# Patient Record
Sex: MALE | NOT HISPANIC OR LATINO | Employment: FULL TIME | URBAN - METROPOLITAN AREA
[De-identification: names, ages, dates, MRNs, and addresses within clinical notes are randomized per-mention and may not be internally consistent; named-entity substitution may affect disease eponyms.]

---

## 2017-04-27 ENCOUNTER — OFFICE VISIT - HEALTHEAST (OUTPATIENT)
Dept: INTERNAL MEDICINE | Facility: CLINIC | Age: 57
End: 2017-04-27

## 2017-04-27 DIAGNOSIS — Z00.00 ROUTINE GENERAL MEDICAL EXAMINATION AT A HEALTH CARE FACILITY: ICD-10-CM

## 2017-04-27 LAB
CHOLEST SERPL-MCNC: 160 MG/DL
FASTING STATUS PATIENT QL REPORTED: YES
HDLC SERPL-MCNC: 50 MG/DL
LDLC SERPL CALC-MCNC: 99 MG/DL
PSA SERPL-MCNC: 0.5 NG/ML (ref 0–3.5)
TRIGL SERPL-MCNC: 56 MG/DL

## 2017-04-27 ASSESSMENT — MIFFLIN-ST. JEOR: SCORE: 1730.92

## 2017-04-28 ENCOUNTER — COMMUNICATION - HEALTHEAST (OUTPATIENT)
Dept: INTERNAL MEDICINE | Facility: CLINIC | Age: 57
End: 2017-04-28

## 2017-05-02 ENCOUNTER — RECORDS - HEALTHEAST (OUTPATIENT)
Dept: ADMINISTRATIVE | Facility: OTHER | Age: 57
End: 2017-05-02

## 2017-09-14 ENCOUNTER — RECORDS - HEALTHEAST (OUTPATIENT)
Dept: ADMINISTRATIVE | Facility: OTHER | Age: 57
End: 2017-09-14

## 2017-10-26 ENCOUNTER — RECORDS - HEALTHEAST (OUTPATIENT)
Dept: ADMINISTRATIVE | Facility: OTHER | Age: 57
End: 2017-10-26

## 2018-03-05 ENCOUNTER — COMMUNICATION - HEALTHEAST (OUTPATIENT)
Dept: INTERNAL MEDICINE | Facility: CLINIC | Age: 58
End: 2018-03-05

## 2019-01-25 ENCOUNTER — OFFICE VISIT - HEALTHEAST (OUTPATIENT)
Dept: INTERNAL MEDICINE | Facility: CLINIC | Age: 59
End: 2019-01-25

## 2019-01-25 DIAGNOSIS — Z00.00 ROUTINE GENERAL MEDICAL EXAMINATION AT A HEALTH CARE FACILITY: ICD-10-CM

## 2019-01-25 LAB
ALBUMIN SERPL-MCNC: 4.3 G/DL (ref 3.5–5)
ALBUMIN UR-MCNC: NEGATIVE MG/DL
ALP SERPL-CCNC: 63 U/L (ref 45–120)
ALT SERPL W P-5'-P-CCNC: 38 U/L (ref 0–45)
ANION GAP SERPL CALCULATED.3IONS-SCNC: 9 MMOL/L (ref 5–18)
APPEARANCE UR: CLEAR
AST SERPL W P-5'-P-CCNC: 25 U/L (ref 0–40)
BILIRUB SERPL-MCNC: 1.5 MG/DL (ref 0–1)
BILIRUB UR QL STRIP: NEGATIVE
BUN SERPL-MCNC: 13 MG/DL (ref 8–22)
CALCIUM SERPL-MCNC: 9.9 MG/DL (ref 8.5–10.5)
CHLORIDE BLD-SCNC: 105 MMOL/L (ref 98–107)
CHOLEST SERPL-MCNC: 173 MG/DL
CO2 SERPL-SCNC: 28 MMOL/L (ref 22–31)
COLOR UR AUTO: NORMAL
CREAT SERPL-MCNC: 1.21 MG/DL (ref 0.7–1.3)
ERYTHROCYTE [DISTWIDTH] IN BLOOD BY AUTOMATED COUNT: 12.8 % (ref 11–14.5)
FASTING STATUS PATIENT QL REPORTED: YES
GFR SERPL CREATININE-BSD FRML MDRD: >60 ML/MIN/1.73M2
GLUCOSE BLD-MCNC: 96 MG/DL (ref 70–125)
GLUCOSE UR STRIP-MCNC: NEGATIVE MG/DL
HCT VFR BLD AUTO: 44.9 % (ref 40–54)
HDLC SERPL-MCNC: 53 MG/DL
HGB BLD-MCNC: 15.6 G/DL (ref 14–18)
HGB UR QL STRIP: NEGATIVE
KETONES UR STRIP-MCNC: NEGATIVE MG/DL
LDLC SERPL CALC-MCNC: 109 MG/DL
LEUKOCYTE ESTERASE UR QL STRIP: NEGATIVE
MCH RBC QN AUTO: 31.1 PG (ref 27–34)
MCHC RBC AUTO-ENTMCNC: 34.7 G/DL (ref 32–36)
MCV RBC AUTO: 90 FL (ref 80–100)
NITRATE UR QL: NEGATIVE
PH UR STRIP: 6.5 [PH] (ref 4.5–8)
PLATELET # BLD AUTO: 244 THOU/UL (ref 140–440)
PMV BLD AUTO: 7.6 FL (ref 7–10)
POTASSIUM BLD-SCNC: 4.4 MMOL/L (ref 3.5–5)
PROT SERPL-MCNC: 7.2 G/DL (ref 6–8)
PSA SERPL-MCNC: 0.4 NG/ML (ref 0–3.5)
RBC # BLD AUTO: 5 MILL/UL (ref 4.4–6.2)
SODIUM SERPL-SCNC: 142 MMOL/L (ref 136–145)
SP GR UR STRIP: 1.01 (ref 1–1.03)
TRIGL SERPL-MCNC: 56 MG/DL
TSH SERPL DL<=0.005 MIU/L-ACNC: 1.6 UIU/ML (ref 0.3–5)
UROBILINOGEN UR STRIP-ACNC: NORMAL
WBC: 5.8 THOU/UL (ref 4–11)

## 2019-01-25 ASSESSMENT — MIFFLIN-ST. JEOR: SCORE: 1789.89

## 2019-01-28 ENCOUNTER — COMMUNICATION - HEALTHEAST (OUTPATIENT)
Dept: INTERNAL MEDICINE | Facility: CLINIC | Age: 59
End: 2019-01-28

## 2020-01-16 ENCOUNTER — RECORDS - HEALTHEAST (OUTPATIENT)
Dept: ADMINISTRATIVE | Facility: OTHER | Age: 60
End: 2020-01-16

## 2020-01-27 ENCOUNTER — OFFICE VISIT - HEALTHEAST (OUTPATIENT)
Dept: INTERNAL MEDICINE | Facility: CLINIC | Age: 60
End: 2020-01-27

## 2020-01-27 ENCOUNTER — COMMUNICATION - HEALTHEAST (OUTPATIENT)
Dept: INTERNAL MEDICINE | Facility: CLINIC | Age: 60
End: 2020-01-27

## 2020-01-27 DIAGNOSIS — Z00.00 ROUTINE GENERAL MEDICAL EXAMINATION AT A HEALTH CARE FACILITY: ICD-10-CM

## 2020-01-27 LAB
ALBUMIN SERPL-MCNC: 4.1 G/DL (ref 3.5–5)
ALBUMIN UR-MCNC: NEGATIVE MG/DL
ALP SERPL-CCNC: 57 U/L (ref 45–120)
ALT SERPL W P-5'-P-CCNC: 15 U/L (ref 0–45)
ANION GAP SERPL CALCULATED.3IONS-SCNC: 8 MMOL/L (ref 5–18)
APPEARANCE UR: CLEAR
AST SERPL W P-5'-P-CCNC: 17 U/L (ref 0–40)
BACTERIA #/AREA URNS HPF: ABNORMAL HPF
BILIRUB SERPL-MCNC: 1.1 MG/DL (ref 0–1)
BILIRUB UR QL STRIP: NEGATIVE
BUN SERPL-MCNC: 27 MG/DL (ref 8–22)
CALCIUM SERPL-MCNC: 9.3 MG/DL (ref 8.5–10.5)
CHLORIDE BLD-SCNC: 106 MMOL/L (ref 98–107)
CHOLEST SERPL-MCNC: 149 MG/DL
CO2 SERPL-SCNC: 27 MMOL/L (ref 22–31)
COLOR UR AUTO: YELLOW
CREAT SERPL-MCNC: 1.08 MG/DL (ref 0.7–1.3)
ERYTHROCYTE [DISTWIDTH] IN BLOOD BY AUTOMATED COUNT: 11.4 % (ref 11–14.5)
FASTING STATUS PATIENT QL REPORTED: YES
GFR SERPL CREATININE-BSD FRML MDRD: >60 ML/MIN/1.73M2
GLUCOSE BLD-MCNC: 93 MG/DL (ref 70–125)
GLUCOSE UR STRIP-MCNC: NEGATIVE MG/DL
HCT VFR BLD AUTO: 44.1 % (ref 40–54)
HDLC SERPL-MCNC: 45 MG/DL
HGB BLD-MCNC: 15.1 G/DL (ref 14–18)
HGB UR QL STRIP: ABNORMAL
KETONES UR STRIP-MCNC: NEGATIVE MG/DL
LDLC SERPL CALC-MCNC: 97 MG/DL
LEUKOCYTE ESTERASE UR QL STRIP: NEGATIVE
MCH RBC QN AUTO: 31.2 PG (ref 27–34)
MCHC RBC AUTO-ENTMCNC: 34.3 G/DL (ref 32–36)
MCV RBC AUTO: 91 FL (ref 80–100)
NITRATE UR QL: NEGATIVE
PH UR STRIP: 6 [PH] (ref 5–8)
PLATELET # BLD AUTO: 223 THOU/UL (ref 140–440)
PMV BLD AUTO: 7.7 FL (ref 7–10)
POTASSIUM BLD-SCNC: 4.5 MMOL/L (ref 3.5–5)
PROT SERPL-MCNC: 6.4 G/DL (ref 6–8)
PSA SERPL-MCNC: 0.4 NG/ML (ref 0–4.5)
RBC # BLD AUTO: 4.85 MILL/UL (ref 4.4–6.2)
RBC #/AREA URNS AUTO: ABNORMAL HPF
SODIUM SERPL-SCNC: 141 MMOL/L (ref 136–145)
SP GR UR STRIP: 1.02 (ref 1–1.03)
SQUAMOUS #/AREA URNS AUTO: ABNORMAL LPF
TRIGL SERPL-MCNC: 36 MG/DL
TSH SERPL DL<=0.005 MIU/L-ACNC: 1.58 UIU/ML (ref 0.3–5)
UROBILINOGEN UR STRIP-ACNC: ABNORMAL
WBC #/AREA URNS AUTO: ABNORMAL HPF
WBC: 4.6 THOU/UL (ref 4–11)

## 2020-01-27 ASSESSMENT — MIFFLIN-ST. JEOR: SCORE: 1780.82

## 2020-01-28 ENCOUNTER — COMMUNICATION - HEALTHEAST (OUTPATIENT)
Dept: INTERNAL MEDICINE | Facility: CLINIC | Age: 60
End: 2020-01-28

## 2020-02-05 ENCOUNTER — RECORDS - HEALTHEAST (OUTPATIENT)
Dept: ADMINISTRATIVE | Facility: OTHER | Age: 60
End: 2020-02-05

## 2020-02-07 ENCOUNTER — RECORDS - HEALTHEAST (OUTPATIENT)
Dept: ADMINISTRATIVE | Facility: OTHER | Age: 60
End: 2020-02-07

## 2020-02-12 ENCOUNTER — TRANSFERRED RECORDS (OUTPATIENT)
Dept: HEALTH INFORMATION MANAGEMENT | Facility: CLINIC | Age: 60
End: 2020-02-12

## 2020-02-14 ENCOUNTER — ANESTHESIA - HEALTHEAST (OUTPATIENT)
Dept: SURGERY | Facility: CLINIC | Age: 60
End: 2020-02-14

## 2020-02-14 ENCOUNTER — RECORDS - HEALTHEAST (OUTPATIENT)
Dept: ADMINISTRATIVE | Facility: OTHER | Age: 60
End: 2020-02-14

## 2020-02-17 ENCOUNTER — HOSPITAL ENCOUNTER (OUTPATIENT)
Dept: CT IMAGING | Facility: CLINIC | Age: 60
Discharge: HOME | End: 2020-02-17
Attending: SURGERY

## 2020-02-17 ENCOUNTER — TRANSFERRED RECORDS (OUTPATIENT)
Dept: HEALTH INFORMATION MANAGEMENT | Facility: CLINIC | Age: 60
End: 2020-02-17

## 2020-02-17 ENCOUNTER — HOSPITAL ENCOUNTER (OUTPATIENT)
Dept: ULTRASOUND IMAGING | Facility: CLINIC | Age: 60
Discharge: HOME | End: 2020-02-17
Attending: SURGERY

## 2020-02-17 ENCOUNTER — RECORDS - HEALTHEAST (OUTPATIENT)
Dept: ADMINISTRATIVE | Facility: OTHER | Age: 60
End: 2020-02-17

## 2020-02-17 DIAGNOSIS — R22.32 ARM MASS, LEFT: ICD-10-CM

## 2020-02-17 LAB
HGB BLD-MCNC: 14.5 G/DL (ref 14–18)
INR PPP: 1.03 (ref 0.9–1.1)
PLATELET # BLD AUTO: 220 THOU/UL (ref 140–440)

## 2020-02-17 ASSESSMENT — MIFFLIN-ST. JEOR: SCORE: 1769.25

## 2020-02-18 ENCOUNTER — HOSPITAL ENCOUNTER (OUTPATIENT)
Dept: CT IMAGING | Facility: CLINIC | Age: 60
Discharge: HOME | End: 2020-02-18
Attending: SURGERY

## 2020-02-18 ENCOUNTER — TRANSFERRED RECORDS (OUTPATIENT)
Dept: HEALTH INFORMATION MANAGEMENT | Facility: CLINIC | Age: 60
End: 2020-02-18

## 2020-02-18 DIAGNOSIS — R22.32 ARM MASS, LEFT: ICD-10-CM

## 2020-02-20 ENCOUNTER — RECORDS - HEALTHEAST (OUTPATIENT)
Dept: ADMINISTRATIVE | Facility: OTHER | Age: 60
End: 2020-02-20

## 2020-02-27 ENCOUNTER — COMMUNICATION - HEALTHEAST (OUTPATIENT)
Dept: INTERNAL MEDICINE | Facility: CLINIC | Age: 60
End: 2020-02-27

## 2020-02-27 ENCOUNTER — AMBULATORY - HEALTHEAST (OUTPATIENT)
Dept: INTERNAL MEDICINE | Facility: CLINIC | Age: 60
End: 2020-02-27

## 2020-02-27 DIAGNOSIS — Z00.00 ROUTINE GENERAL MEDICAL EXAMINATION AT A HEALTH CARE FACILITY: ICD-10-CM

## 2020-03-04 LAB
CAP COMMENT: ABNORMAL
LAB AP CHARGES (HE HISTORICAL CONVERSION): ABNORMAL
LAB AP INITIAL CYTO EVAL (HE HISTORICAL CONVERSION): ABNORMAL
LAB MED GENERAL PATH INTERP (HE HISTORICAL CONVERSION): ABNORMAL
PATH REPORT.COMMENTS IMP SPEC: ABNORMAL
PATH REPORT.COMMENTS IMP SPEC: ABNORMAL
PATH REPORT.FINAL DX SPEC: ABNORMAL
PATH REPORT.MICROSCOPIC SPEC OTHER STN: ABNORMAL
PATH REPORT.RELEVANT HX SPEC: ABNORMAL
RESULT FLAG (HE HISTORICAL CONVERSION): ABNORMAL
SPECIMEN DESCRIPTION: ABNORMAL

## 2020-03-12 ENCOUNTER — COMMUNICATION - HEALTHEAST (OUTPATIENT)
Dept: INTERNAL MEDICINE | Facility: CLINIC | Age: 60
End: 2020-03-12

## 2020-03-13 ENCOUNTER — TRANSCRIBE ORDERS (OUTPATIENT)
Dept: OTHER | Age: 60
End: 2020-03-13

## 2020-03-13 DIAGNOSIS — R22.32 MASS OF ARM, LEFT: Primary | ICD-10-CM

## 2020-04-20 ENCOUNTER — TELEPHONE (OUTPATIENT)
Dept: ORTHOPEDICS | Facility: CLINIC | Age: 60
End: 2020-04-20

## 2020-04-20 NOTE — TELEPHONE ENCOUNTER
I attempted to reach this patient to convert his appointment to a Video/Telephone Visit due to COVID-19. I was unsuccessful and was unable to leave a VM as patient's line has been disconnected. If patient calls back, please update Patient's Demographics and offer to switch to Video/Telephone Consult.

## 2020-04-30 ENCOUNTER — VIRTUAL VISIT (OUTPATIENT)
Dept: ORTHOPEDICS | Facility: CLINIC | Age: 60
End: 2020-04-30
Attending: SURGERY
Payer: COMMERCIAL

## 2020-04-30 ENCOUNTER — RECORDS - HEALTHEAST (OUTPATIENT)
Dept: ADMINISTRATIVE | Facility: OTHER | Age: 60
End: 2020-04-30

## 2020-04-30 VITALS — BODY MASS INDEX: 30.1 KG/M2 | WEIGHT: 215 LBS | HEIGHT: 71 IN

## 2020-04-30 DIAGNOSIS — M79.89 SOFT TISSUE MASS: Primary | ICD-10-CM

## 2020-04-30 PROBLEM — M71.9 DISORDER OF BURSAE AND TENDONS IN SHOULDER REGION: Status: ACTIVE | Noted: 2020-04-30

## 2020-04-30 PROBLEM — M67.919 DISORDER OF BURSAE AND TENDONS IN SHOULDER REGION: Status: ACTIVE | Noted: 2020-04-30

## 2020-04-30 PROBLEM — E66.3 OVERWEIGHT: Status: ACTIVE | Noted: 2020-04-30

## 2020-04-30 ASSESSMENT — MIFFLIN-ST. JEOR: SCORE: 1807.36

## 2020-04-30 NOTE — PROGRESS NOTES
"Carl Desai is a 59 year old male who is being evaluated via a billable video visit.      The patient has been notified of following:     \"This video visit will be conducted via a call between you and your physician/provider. We have found that certain health care needs can be provided without the need for an in-person physical exam.  This service lets us provide the care you need with a video conversation.  If a prescription is necessary we can send it directly to your pharmacy.  If lab work is needed we can place an order for that and you can then stop by our lab to have the test done at a later time.    Video visits are billed at different rates depending on your insurance coverage.  Please reach out to your insurance provider with any questions.    If during the course of the call the physician/provider feels a video visit is not appropriate, you will not be charged for this service.\"    Patient has given verbal consent for Video visit? Yes    How would you like to obtain your AVS? Shefali    Patient would like the video invitation sent by: Send to e-mail at: [email or web address]    Will anyone else be joining your video visit? No      "

## 2020-04-30 NOTE — LETTER
4/30/2020       RE: Carl Desai  [address]  [place] [facility] [address]     Dear Colleague,    Thank you for referring your patient, Carl Desai, to the [facility] at [facility]. Please see a copy of my visit note below.        [facility], Orthopaedic Oncology Surgery Consultation  by Willie Payton M.D.    Carl Desai MRN# [id number]   Age: 59 year old YOB: 1960     Requesting physician: Dr. Martin Moreau, general surgeon, [facility]  Dr. Jeffery Johnson, [facility] PCP    .          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  Prior biopsy specimen represents limited sampling with cytological evaluation.  Final diagnosis pending evaluation of the entire resected specimen.    Plan:  I advised the patient undergo complete excision of the tumor with negative margins.  We discussed the possible triceps muscle weakness that may develop afterwards.  Furthermore we discussed the risks of radial nerve palsy.  In addition we discussed the rationale for the surgery and the possibility of local relapse.  Further recommendations regarding any other adjuvant treatments would depend upon diagnostic evaluation of the entire tumor specimen once resected.  We also discussed the expected recovery.  I would anticipate an outpatient surgical procedure and refraining from any strenuous or athletic activity for 3 weeks.           History of Present Illness:   59 year old male  chief complaint    Soft tissue mass L arm.  Slight enlargement.  Saw Dr. Martin Moreau, general surgeon, MRI done. Referred to ortho oncology given the MR appearance.    2/17/2020, US guided core needle biopsy, [facility]. 4 corers sent for cytology, atypical cells noted, c/w pleomorphic lipoma      Current symptoms:  Problem: L ARM, non dominant  Onset and duration: 7-8 years  Awakens from sleep due to sx's:   No  Precipitating Injury:  No    Other joints or sites painful:  No  Fever: No  Appetite change or weight loss: No               Physical Exam:     EXAMINATION pertinent findings:   PSYCH: Pleasant, healthy-appearing, alert, oriented x3, cooperative. Normal mood and affect.  VITAL SIGNS: There were no vitals taken for this visit..  Reviewed nursing intake notes.   There is no height or weight on file to calculate BMI.  RESP: non labored breathing   ABD: Deferred due to video visit  SKIN: grossly normal skin overlying tumor mass left arm  LYMPHATIC: grossly normal, no adenopathy, no extremity edema noted on video exam  NEURO: grossly normal , no motor deficits radial ulnar median nerve motor function normal  VASCULAR: satisfactory perfusion of all extremities   MUSCULOSKELETAL:   Soft tissue mass left triceps.  Nontender.  No erythema or edema noted.  Full range of motion of shoulder and elbow noted.  No muscle atrophy of the biceps or triceps appreciated.  Palpable protuberance of the triceps muscle noted.             Data:   All laboratory data reviewed  All imaging studies reviewed by me             Care Everywhere Result Report  Medical cytologyResulted: 3/4/2020 3:25 PM  [facility]  Component Name Value Ref Range   Case Report Medical Cytology                                  Case: [id number]                                   Authorizing Provider:  Martin Moreau MD        Collected:           02/17/2020 1114              Ordering Location:     [facility]      Received:            02/17/2020 1158                                     Ultrasound                                                                   Pathologist:           Layton Ortez MD                                                        Specimen:    Other, left tricep mass                                                                   Final Diagnosis SOFT TISSUE, LEFT TRICEPS MASS, CORE BIOPSY:    - MOST CONSISTENT  WITH PLEOMORPHIC LIPOMA    - SEE [facility] REPORT ([id number])     Comment S-100 is generally negative in the tumor cells. Proliferation rate by Ki-67 is approximately 10%. All controls stain appropriately.     Clinical Information Left tricep mass     Specimen Description Ultrasound-guided core biopsy performed by Dr. Benjamín Lynch with 4 passes.    4 Diff-Quik slides    1 Tissue/cell block     Specimen Processing     Comment: All histology slide preparation and stains; and cytology slide preparation, staining, and cytotechnologist screening done at [facility] are performed at [facility],[address] [place] [address], [employer], with final interpretation, frozen section analysis, and cytology adequacy assessment at the indicated laboratory.     Initial Cytologic Evaluation Site #1, Episode #1, # Passes 4: Diagnostic. [facility]     Special Stains Note - Ki67 Immunoperoxidase Stain:  This stain was done using the following criteria:    Specimen fixative: Formalin-fixed paraffin-embedded sections    Detection system: Biotin-free multimer-based technology detection system    Clone:  30-9 ([place])    Scoring method: % of cells stained     Charges CPT: 27698, 96369, 40216, 27095   ICD-10: D17.9    cc: Benjamín Lynch MD     Result Flag Abnormal (A) Normal    General Path Interpretation Atypical cells present (A) Negative for malignant cells, Non-Diagnostic    Specimen Collected on   Body Fluid - Miscellaneous samples (specimen) 2/17/2020 11:14 AM   Result Narrative   This result has an attachment that is not available.         Attending MD (Dr. Willie Payton) Attestation :  This patient was seen and evaluated by me including a history, exam, and interpretation of all imaging and/or lab data.  Either a training physician (resident/fellow), who also saw the patient, or scribe has documented the clinic visit in the attached note.    Willie Payton MD  [facility] Family Professor  Oncology and Adult Reconstructive  Surgery  Dept Orthopaedic Surgery, [facility]  [phone] office, [phone] pager  [email or web address]      Total Time = 45 min, 50% of which was spent in counseling and coordination of care as documented above.  Video duration 25 min, pre and post video time 20 min.    DATA for DOCUMENTATION:         Past Medical History:   There is no problem list on file for this patient.    No past medical history on file.    Also see scanned health assessment forms.       Past Surgical History:   No past surgical history on file.         Social History:     Social History     Socioeconomic History     Marital status:      Spouse name: Not on file     Number of children: Not on file     Years of education: Not on file     Highest education level: Not on file   Occupational History     Not on file   Social Needs     Financial resource strain: Not on file     Food insecurity     Worry: Not on file     Inability: Not on file     Transportation needs     Medical: Not on file     Non-medical: Not on file   Tobacco Use     Smoking status: Not on file   Substance and Sexual Activity     Alcohol use: Not on file     Drug use: Not on file     Sexual activity: Not on file   Lifestyle     Physical activity     Days per week: Not on file     Minutes per session: Not on file     Stress: Not on file   Relationships     Social connections     Talks on phone: Not on file     Gets together: Not on file     Attends Buddhism service: Not on file     Active member of club or organization: Not on file     Attends meetings of clubs or organizations: Not on file     Relationship status: Not on file     Intimate partner violence     Fear of current or ex partner: Not on file     Emotionally abused: Not on file     Physically abused: Not on file     Forced sexual activity: Not on file   Other Topics Concern     Not on file   Social History Narrative     Not on file            Family History:     No family history on file.          "Medications:     No current outpatient medications on file.     No current facility-administered medications for this visit.               Review of Systems:   A comprehensive 10 point review of systems (constitutional, ENT, cardiac, peripheral vascular, lymphatic, respiratory, GI, , Musculoskeletal, skin, Neurological) was performed and found to be negative except as described in this note.     See intake form completed by patient      Carl Desai is a 59 year old male who is being evaluated via a billable video visit.      The patient has been notified of following:     \"This video visit will be conducted via a call between you and your physician/provider. We have found that certain health care needs can be provided without the need for an in-person physical exam.  This service lets us provide the care you need with a video conversation.  If a prescription is necessary we can send it directly to your pharmacy.  If lab work is needed we can place an order for that and you can then stop by our lab to have the test done at a later time.    Video visits are billed at different rates depending on your insurance coverage.  Please reach out to your insurance provider with any questions.    If during the course of the call the physician/provider feels a video visit is not appropriate, you will not be charged for this service.\"    Patient has given verbal consent for Video visit? Yes    How would you like to obtain your AVS? MyChart    Patient would like the video invitation sent by: Send to e-mail at: [email or web address]    Will anyone else be joining your video visit? No        Again, thank you for allowing me to participate in the care of your patient.      Sincerely,    Willie Payton MD    "

## 2020-04-30 NOTE — PROGRESS NOTES
[facility], Orthopaedic Oncology Surgery Consultation  by Willie Payton M.D.    Carl Desai MRN# [id number]   Age: 59 year old YOB: 1960     Requesting physician: Dr. Martin Moreau, general surgeon, [facility]  Dr. Jeffery Johnson, [facility] PCP    .          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  Prior biopsy specimen represents limited sampling with cytological evaluation.  Final diagnosis pending evaluation of the entire resected specimen.    Plan:  I advised the patient undergo complete excision of the tumor with negative margins.  We discussed the possible triceps muscle weakness that may develop afterwards.  Furthermore we discussed the risks of radial nerve palsy.  In addition we discussed the rationale for the surgery and the possibility of local relapse.  Further recommendations regarding any other adjuvant treatments would depend upon diagnostic evaluation of the entire tumor specimen once resected.  We also discussed the expected recovery.  I would anticipate an outpatient surgical procedure and refraining from any strenuous or athletic activity for 3 weeks.           History of Present Illness:   59 year old male  chief complaint    Soft tissue mass L arm.  Slight enlargement.  Saw Dr. Martin Moreau, general surgeon, MRI done. Referred to ortho oncology given the MR appearance.    2/17/2020, US guided core needle biopsy, [facility]. 4 corers sent for cytology, atypical cells noted, c/w pleomorphic lipoma      Current symptoms:  Problem: L ARM, non dominant  Onset and duration: 7-8 years  Awakens from sleep due to sx's:  No  Precipitating Injury:  No    Other joints or sites painful:  No  Fever: No  Appetite change or weight loss: No               Physical Exam:     EXAMINATION pertinent findings:   PSYCH: Pleasant, healthy-appearing, alert, oriented x3, cooperative. Normal mood and affect.  VITAL SIGNS: There  were no vitals taken for this visit..  Reviewed nursing intake notes.   There is no height or weight on file to calculate BMI.  RESP: non labored breathing   ABD: Deferred due to video visit  SKIN: grossly normal skin overlying tumor mass left arm  LYMPHATIC: grossly normal, no adenopathy, no extremity edema noted on video exam  NEURO: grossly normal , no motor deficits radial ulnar median nerve motor function normal  VASCULAR: satisfactory perfusion of all extremities   MUSCULOSKELETAL:   Soft tissue mass left triceps.  Nontender.  No erythema or edema noted.  Full range of motion of shoulder and elbow noted.  No muscle atrophy of the biceps or triceps appreciated.  Palpable protuberance of the triceps muscle noted.             Data:   All laboratory data reviewed  All imaging studies reviewed by [facility] Everywhere Result Report  Medical cytologyResulted: 3/4/2020 3:25 PM  [facility]  Component Name Value Ref Range   Case Report Medical Cytology                                  Case: [id number]                                   Authorizing Provider:  Martin Moreau MD        Collected:           02/17/2020 1114              Ordering Location:     [facility]      Received:            02/17/2020 1158                                     Ultrasound                                                                   Pathologist:           Layton Ortez MD                                                        Specimen:    Other, left tricep mass                                                                   Final Diagnosis SOFT TISSUE, LEFT TRICEPS MASS, CORE BIOPSY:    - MOST CONSISTENT WITH PLEOMORPHIC LIPOMA    - SEE [facility] REPORT ([id number])     Comment S-100 is generally negative in the tumor cells. Proliferation rate by Ki-67 is approximately 10%. All controls stain appropriately.     Clinical Information Left tricep mass     Specimen Description Ultrasound-guided  core biopsy performed by Dr. Benjamín Lynch with 4 passes.    4 Diff-Quik slides    1 Tissue/cell block     Specimen Processing     Comment: All histology slide preparation and stains; and cytology slide preparation, staining, and cytotechnologist screening done at [facility] are performed at [facility],[address] [place] [address], [address], with final interpretation, frozen section analysis, and cytology adequacy assessment at the indicated laboratory.     Initial Cytologic Evaluation Site #1, Episode #1, # Passes 4: Diagnostic. [facility]     Special Stains Note - Ki67 Immunoperoxidase Stain:  This stain was done using the following criteria:    Specimen fixative: Formalin-fixed paraffin-embedded sections    Detection system: Biotin-free multimer-based technology detection system    Clone:  30-9 (Bramasol)    Scoring method: % of cells stained     Charges CPT: 78089, 23953, 31837, 24256   ICD-10: D17.9    cc: Benjamín Lynch MD     Result Flag Abnormal (A) Normal    General Path Interpretation Atypical cells present (A) Negative for malignant cells, Non-Diagnostic    Specimen Collected on   Body Fluid - Miscellaneous samples (specimen) 2/17/2020 11:14 AM   Result Narrative   This result has an attachment that is not available.         Attending MD (Dr. Willie Payton) Attestation :  This patient was seen and evaluated by me including a history, exam, and interpretation of all imaging and/or lab data.  Either a training physician (resident/fellow), who also saw the patient, or scribe has documented the clinic visit in the attached note.    MD Javed Aceves Family Professor  Oncology and Adult Reconstructive Surgery  Dept Orthopaedic Surgery, formerly [facility]  [phone] office, [phone] pager  [email or web address]      Total Time = 45 min, 50% of which was spent in counseling and coordination of care as documented above.  Video duration 25 min, pre and post video time 20 min.    DATA for  DOCUMENTATION:         Past Medical History:   There is no problem list on file for this patient.    No past medical history on file.    Also see scanned health assessment forms.       Past Surgical History:   No past surgical history on file.         Social History:     Social History     Socioeconomic History     Marital status:      Spouse name: Not on file     Number of children: Not on file     Years of education: Not on file     Highest education level: Not on file   Occupational History     Not on file   Social Needs     Financial resource strain: Not on file     Food insecurity     Worry: Not on file     Inability: Not on file     Transportation needs     Medical: Not on file     Non-medical: Not on file   Tobacco Use     Smoking status: Not on file   Substance and Sexual Activity     Alcohol use: Not on file     Drug use: Not on file     Sexual activity: Not on file   Lifestyle     Physical activity     Days per week: Not on file     Minutes per session: Not on file     Stress: Not on file   Relationships     Social connections     Talks on phone: Not on file     Gets together: Not on file     Attends Restorationist service: Not on file     Active member of club or organization: Not on file     Attends meetings of clubs or organizations: Not on file     Relationship status: Not on file     Intimate partner violence     Fear of current or ex partner: Not on file     Emotionally abused: Not on file     Physically abused: Not on file     Forced sexual activity: Not on file   Other Topics Concern     Not on file   Social History Narrative     Not on file            Family History:     No family history on file.         Medications:     No current outpatient medications on file.     No current facility-administered medications for this visit.               Review of Systems:   A comprehensive 10 point review of systems (constitutional, ENT, cardiac, peripheral vascular, lymphatic, respiratory, GI, ,  Musculoskeletal, skin, Neurological) was performed and found to be negative except as described in this note.     See intake form completed by patient

## 2020-07-27 DIAGNOSIS — M79.89 SOFT TISSUE MASS: Primary | ICD-10-CM

## 2020-07-28 ENCOUNTER — TELEPHONE (OUTPATIENT)
Dept: ORTHOPEDICS | Facility: CLINIC | Age: 60
End: 2020-07-28

## 2020-07-28 ENCOUNTER — PREP FOR PROCEDURE (OUTPATIENT)
Dept: ORTHOPEDICS | Facility: CLINIC | Age: 60
End: 2020-07-28

## 2020-07-28 DIAGNOSIS — M79.89 SOFT TISSUE MASS: Primary | ICD-10-CM

## 2020-07-28 NOTE — TELEPHONE ENCOUNTER
This writer left detailed VM regarding MRI recommended by Dr. Payton. Order is in/ needs to be scheduled. Also, in-person visit with Dr. Payton to do preop and examination.  Surgery date could also be discussed.  Call back numbers given.    Lexis Barajas RN on 7/28/2020 at 1:18 PM

## 2020-08-05 ENCOUNTER — ANCILLARY PROCEDURE (OUTPATIENT)
Dept: MRI IMAGING | Facility: CLINIC | Age: 60
End: 2020-08-05
Attending: ORTHOPAEDIC SURGERY
Payer: COMMERCIAL

## 2020-08-05 DIAGNOSIS — M79.89 SOFT TISSUE MASS: ICD-10-CM

## 2020-08-05 RX ORDER — GADOBUTROL 604.72 MG/ML
10 INJECTION INTRAVENOUS ONCE
Status: COMPLETED | OUTPATIENT
Start: 2020-08-05 | End: 2020-08-05

## 2020-08-05 RX ADMIN — GADOBUTROL 10 ML: 604.72 INJECTION INTRAVENOUS at 09:17

## 2020-08-05 NOTE — DISCHARGE INSTRUCTIONS
MRI Contrast Discharge Instructions    The IV contrast you received today will pass out of your body in your  urine. This will happen in the next 24 hours. You will not feel this process.  Your urine will not change color.    Drink at least 4 extra glasses of water or juice today (unless your doctor  has restricted your fluids). This reduces the stress on your kidneys.  You may take your regular medicines.    If you are on dialysis: It is best to have dialysis today.    If you have a reaction: Most reactions happen right away. If you have  any new symptoms after leaving the hospital (such as hives or swelling),  call your hospital at the correct number below. Or call your family doctor.  If you have breathing distress or wheezing, call 911.    Special instructions: ***    I have read and understand the above information.    Signature:______________________________________ Date:___________    Staff:__________________________________________ Date:___________     Time:__________    [facility] Departments:    ___[place]: [phone]  ___[facility]: [phone]  ___[place]: [phone] ___[facility]: [phone]  ___[facility]: [phone]  ___[facility]: [phone]  ___[phone]  ___[facility]: [phone]  ___[phone]

## 2020-08-12 ASSESSMENT — ENCOUNTER SYMPTOMS
MYALGIAS: 0
NECK PAIN: 0
MUSCLE CRAMPS: 0
ARTHRALGIAS: 1
STIFFNESS: 0
MUSCLE WEAKNESS: 0
JOINT SWELLING: 0
BACK PAIN: 0

## 2020-08-13 ENCOUNTER — RECORDS - HEALTHEAST (OUTPATIENT)
Dept: ADMINISTRATIVE | Facility: OTHER | Age: 60
End: 2020-08-13

## 2020-08-13 ENCOUNTER — PREP FOR PROCEDURE (OUTPATIENT)
Dept: ORTHOPEDICS | Facility: CLINIC | Age: 60
End: 2020-08-13

## 2020-08-13 ENCOUNTER — OFFICE VISIT (OUTPATIENT)
Dept: ORTHOPEDICS | Facility: CLINIC | Age: 60
End: 2020-08-13
Payer: COMMERCIAL

## 2020-08-13 VITALS — BODY MASS INDEX: 30.41 KG/M2 | WEIGHT: 217.2 LBS | HEIGHT: 71 IN

## 2020-08-13 DIAGNOSIS — M79.89 SOFT TISSUE MASS: Primary | ICD-10-CM

## 2020-08-13 ASSESSMENT — MIFFLIN-ST. JEOR: SCORE: 1817.34

## 2020-08-13 NOTE — PROGRESS NOTES
[facility] Physicians, Orthopaedic Oncology Surgery Consultation  by Willie Payton M.D.    Carl Desai MRN# [id number]   Age: 59 year old YOB: 1960     Requesting physician: Dr. Martin Moreau, general surgeon, [facility]  Dr. Jeffery Johnson, [facility] PCP    .     DX:   1. 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 2/17/2020, US guided core needle biopsy, [facility]. 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  Prior biopsy specimen represents limited sampling with cytological evaluation.  Final diagnosis pending evaluation of the entire resected specimen.    Plan:  I advised the patient undergo complete excision of the tumor.  Previously we discussed the possibly performing wide excision with negative margins however this may result in excessive risk to radial nerve injury and therefore I recommended marginal excision.  We discussed the possible triceps muscle weakness that may develop afterwards.  Furthermore we discussed the risks of radial nerve palsy.  In addition we discussed the rationale for the surgery and the possibility of local relapse.  Further recommendations regarding any other adjuvant treatments would depend upon diagnostic evaluation of the entire tumor specimen once resected.  We also discussed the expected recovery.  I would anticipate an outpatient surgical procedure and refraining from any strenuous or athletic activity for 3 weeks.           History of Present Illness:   59 year old male  chief complaint    Soft tissue mass L nondominant arm.  Slight enlargement.  Saw Dr. Martin Moreau, general surgeon, MRI done. Referred to ortho oncology given the MR appearance.    I saw patient several months ago and advised surgical excision.  Patient was think about this recommendation presents now for examination.          "Physical Exam:     EXAMINATION pertinent findings:   PSYCH: Pleasant, healthy-appearing, alert, oriented x3, cooperative. Normal mood and affect.  VITAL SIGNS: Height 1.803 m (5' 11\"), weight 98.5 kg (217 lb 3.2 oz)..  Reviewed nursing intake notes.   Body mass index is 30.29 kg/m .  RESP: non labored breathing     MUSCULOSKELETAL:   Soft tissue mass left triceps.  Mobile and nontender.  No erythema or edema noted.  Full range of motion of shoulder and elbow noted.  No muscle atrophy of the biceps or triceps appreciated.  Palpable protuberance of the triceps muscle noted.  Neurologic examination demonstrates intact median radial and ulnar nerve function of the left upper extremity.           Data:   All laboratory data reviewed  All imaging studies reviewed by me             Care Everywhere Result Report  Medical cytologyResulted: 3/4/2020 3:25 PM  [facility]  Component Name Value Ref Range   Case Report Medical Cytology                                  Case: [id number]                                   Authorizing Provider:  Martin Moreau MD        Collected:           02/17/2020 111              Ordering Location:     [facility]      Received:            02/17/2020 1158                                     Ultrasound                                                                   Pathologist:           Layton Ortez MD                                                        Specimen:    Other, left tricep mass                                                                   Final Diagnosis SOFT TISSUE, LEFT TRICEPS MASS, CORE BIOPSY:    - MOST CONSISTENT WITH PLEOMORPHIC LIPOMA    - SEE [facility] REPORT ([id number])     Comment S-100 is generally negative in the tumor cells. Proliferation rate by Ki-67 is approximately 10%. All controls stain appropriately.     Clinical Information Left tricep mass     Specimen Description Ultrasound-guided core biopsy performed by Dr. Butts " Syed with 4 passes.    4 Diff-Quik slides    1 Tissue/cell block     Specimen Processing     Comment: All histology slide preparation and stains; and cytology slide preparation, staining, and cytotechnologist screening done at [facility] are performed at [facility],[address] [place] [address], [address], with final interpretation, frozen section analysis, and cytology adequacy assessment at the indicated laboratory.     Initial Cytologic Evaluation Site #1, Episode #1, # Passes 4: Diagnostic. [facility]     Special Stains Note - Ki67 Immunoperoxidase Stain:  This stain was done using the following criteria:    Specimen fixative: Formalin-fixed paraffin-embedded sections    Detection system: Biotin-free multimer-based technology detection system    Clone:  30-9 ([employer])    Scoring method: % of cells stained     Charges CPT: 58025, 20484, 91730, 56480   ICD-10: D17.9    cc: Benjamín Lynch MD     Result Flag Abnormal (A) Normal    General Path Interpretation Atypical cells present (A) Negative for malignant cells, Non-Diagnostic    Specimen Collected on   Body Fluid - Miscellaneous samples (specimen) 2/17/2020 11:14 AM   Result Narrative   This result has an attachment that is not available.       Total Time = 20 min, 50% of which was spent in counseling and coordination of care as documented above.   Answers for HPI/ROS submitted by the patient on 8/12/2020   General Symptoms: No  Skin Symptoms: No  HENT Symptoms: No  EYE SYMPTOMS: No  HEART SYMPTOMS: No  LUNG SYMPTOMS: No  INTESTINAL SYMPTOMS: No  URINARY SYMPTOMS: No  REPRODUCTIVE SYMPTOMS: No  SKELETAL SYMPTOMS: Yes  BLOOD SYMPTOMS: No  NERVOUS SYSTEM SYMPTOMS: No  MENTAL HEALTH SYMPTOMS: No  Back pain: No  Muscle aches: No  Neck pain: No  Swollen joints: No  Joint pain: Yes  Bone pain: No  Muscle cramps: No  Muscle weakness: No  Joint stiffness: No  Bone fracture: No

## 2020-08-13 NOTE — NURSING NOTE
"Chief Complaint   Patient presents with     Results     Pt. states that he is here today to Discuss Results of MRI of Left Humerus done on 08/05/2020 and Discuss surgery.        59 year old  1960    Ht 1.803 m (5' 11\")   Wt 98.5 kg (217 lb 3.2 oz)   BMI 30.29 kg/m          [id number] - [place] [address] [address] [place] [address] & [facility]    No Known Allergies    Current Outpatient Medications   Medication     Ibuprofen (ADVIL PO)     No current facility-administered medications for this visit.                      "

## 2020-08-13 NOTE — LETTER
8/13/2020         RE: Carl Desai  [address]  [place] [address]        Dear Colleague,    Thank you for referring your patient, Carl Desai, to the [facility]. Please see a copy of my visit note below.        [facility] Physicians, Orthopaedic Oncology Surgery Consultation  by Willie Payton M.D.    Carl Desai MRN# [id number]   Age: 59 year old YOB: 1960     Requesting physician: Dr. Martin Moreau, general surgeon, [facility]  Dr. Jeffery Johnson, [facility] PCP    .     DX:   1. 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 2/17/2020, US guided core needle biopsy, [facility]. 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  Prior biopsy specimen represents limited sampling with cytological evaluation.  Final diagnosis pending evaluation of the entire resected specimen.    Plan:  I advised the patient undergo complete excision of the tumor.  Previously we discussed the possibly performing wide excision with negative margins however this may result in excessive risk to radial nerve injury and therefore I recommended marginal excision.  We discussed the possible triceps muscle weakness that may develop afterwards.  Furthermore we discussed the risks of radial nerve palsy.  In addition we discussed the rationale for the surgery and the possibility of local relapse.  Further recommendations regarding any other adjuvant treatments would depend upon diagnostic evaluation of the entire tumor specimen once resected.  We also discussed the expected recovery.  I would anticipate an outpatient surgical procedure and refraining from any strenuous or athletic activity for 3 weeks.           History of Present Illness:   59 year old male  chief complaint    Soft tissue mass L nondominant arm.  Slight enlargement.  Saw   "Martin Moreau, general surgeon, MRI done. Referred to ortho oncology given the MR appearance.    I saw patient several months ago and advised surgical excision.  Patient was think about this recommendation presents now for examination.         Physical Exam:     EXAMINATION pertinent findings:   PSYCH: Pleasant, healthy-appearing, alert, oriented x3, cooperative. Normal mood and affect.  VITAL SIGNS: Height 1.803 m (5' 11\"), weight 98.5 kg (217 lb 3.2 oz)..  Reviewed nursing intake notes.   Body mass index is 30.29 kg/m .  RESP: non labored breathing     MUSCULOSKELETAL:   Soft tissue mass left triceps.  Mobile and nontender.  No erythema or edema noted.  Full range of motion of shoulder and elbow noted.  No muscle atrophy of the biceps or triceps appreciated.  Palpable protuberance of the triceps muscle noted.  Neurologic examination demonstrates intact median radial and ulnar nerve function of the left upper extremity.           Data:   All laboratory data reviewed  All imaging studies reviewed by me             Care Everywhere Result Report  Medical cytologyResulted: 3/4/2020 3:25 PM  [facility]  Component Name Value Ref Range   Case Report Medical Cytology                                  Case: [id number]                                   Authorizing Provider:  Martin Moreau MD        Collected:           02/17/2020 1114              Ordering Location:     [facility]      Received:            02/17/2020 1158                                     Ultrasound                                                                   Pathologist:           Layton Ortez MD                                                        Specimen:    Other, left tricep mass                                                                   Final Diagnosis SOFT TISSUE, LEFT TRICEPS MASS, CORE BIOPSY:    - MOST CONSISTENT WITH PLEOMORPHIC LIPOMA    - SEE [facility] REPORT ([id number])     Comment S-100 is " generally negative in the tumor cells. Proliferation rate by Ki-67 is approximately 10%. All controls stain appropriately.     Clinical Information Left tricep mass     Specimen Description Ultrasound-guided core biopsy performed by Dr. Benjamín Lynch with 4 passes.    4 Diff-Quik slides    1 Tissue/cell block     Specimen Processing     Comment: All histology slide preparation and stains; and cytology slide preparation, staining, and cytotechnologist screening done at [facility] are performed at [facility],[address] [place] [address], [address], with final interpretation, frozen section analysis, and cytology adequacy assessment at the indicated laboratory.     Initial Cytologic Evaluation Site #1, Episode #1, # Passes 4: Diagnostic. [facility]     Special Stains Note - Ki67 Immunoperoxidase Stain:  This stain was done using the following criteria:    Specimen fixative: Formalin-fixed paraffin-embedded sections    Detection system: Biotin-free multimer-based technology detection system    Clone:  30-9 ([employer])    Scoring method: % of cells stained     Charges CPT: 57421, 67724, 56224, 11896   ICD-10: D17.9    cc: Benjamín Lynch MD     Result Flag Abnormal (A) Normal    General Path Interpretation Atypical cells present (A) Negative for malignant cells, Non-Diagnostic    Specimen Collected on   Body Fluid - Miscellaneous samples (specimen) 2/17/2020 11:14 AM   Result Narrative   This result has an attachment that is not available.       Total Time = 20 min, 50% of which was spent in counseling and coordination of care as documented above.   Answers for HPI/ROS submitted by the patient on 8/12/2020   General Symptoms: No  Skin Symptoms: No  HENT Symptoms: No  EYE SYMPTOMS: No  HEART SYMPTOMS: No  LUNG SYMPTOMS: No  INTESTINAL SYMPTOMS: No  URINARY SYMPTOMS: No  REPRODUCTIVE SYMPTOMS: No  SKELETAL SYMPTOMS: Yes  BLOOD SYMPTOMS: No  NERVOUS SYSTEM SYMPTOMS: No  MENTAL HEALTH SYMPTOMS: No  Back pain:  No  Muscle aches: No  Neck pain: No  Swollen joints: No  Joint pain: Yes  Bone pain: No  Muscle cramps: No  Muscle weakness: No  Joint stiffness: No  Bone fracture: No

## 2020-08-13 NOTE — NURSING NOTE
Teaching Flowsheet   Relevant Diagnosis: soft tissue mass, left upper arm  Teaching Topic: Excisional biopsy left soft tissue mass left tricep     Person(s) involved in teaching:   Patient     Motivation Level:  Asks Questions: Yes  Eager to Learn: Yes  Cooperative: Yes  Receptive (willing/able to accept information): Yes  Any cultural factors/Pentecostal beliefs that may influence understanding or compliance? No       Patient demonstrates understanding of the following:  Reason for the appointment, diagnosis and treatment plan: Yes  Knowledge of proper use of medications and conditions for which they are ordered (with special attention to potential side effects or drug interactions): Yes     Teaching Concerns Addressed:   Surgical teaching - preop H & P, NPO, showering, COVID 19 testing.      Proper use and care of cane (medical equip, care aids, etc.): NA  Nutritional needs and diet plan: Yes  Pain management techniques: Yes  Wound Care: yes  How and/when to access community resources: Yes     Instructional Materials Used/Given: surgical pamphlet and hibiclens given     Time spent with patient: 15 minutes.

## 2020-08-17 DIAGNOSIS — Z11.59 ENCOUNTER FOR SCREENING FOR OTHER VIRAL DISEASES: Primary | ICD-10-CM

## 2020-09-28 NOTE — PROGRESS NOTES
"PRE-OP PLAN    Background: 60 year old male, left arm mass. Neurologic examination demonstrates intact median radial and ulnar nerve function of the left upper extremity.    DX:   1. 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 2/17/2020, US guided core needle biopsy, [facility]. 4 cores sent for cytology, atypical cells noted, c/w pleomorphic lipoma    Surgical Plan: \"I advised the patient undergo complete excision of the tumor.  Previously we discussed the possibly performing wide excision with negative margins however this may result in excessive risk to radial nerve injury and therefore I recommended marginal excision.  We discussed the possible triceps muscle weakness that may develop afterwards.\"        Kj Moon,   Adult Joint Reconstruction Fellow  Dept Orthopaedic Surgery, [facility]  [phone] Office [phone] Pager     Patient Position (indicated by x):     Supine     Supine with torso rolled up on a bump      Floppy lateral on torso length bean bag   x   Lateral decubitus, bean bag, full length      Lateral decubitus, Wixson hip positioner      Safety paddle side supports x 2 clamped to side rail      Lithotomy, both legs in yellow padded leg cochran      Lithotomy, single leg in yellow padded leg cochran      Prone on blanket rolls/round gel pad      Prone on Zach (arched) frame on Sage table      Single thigh in orange arthroscopy clamp      Beach chair semi recumbent      Arm out on radiolucent arm table      Split drape with top bar    x  Blue and White Stockinet, Wei Spence.      Extremity drape   x   Shoulder pack drape      Gilbert catheter            Fracture Table     Sage x-ray table     Regular OR table              General Equipment Requests (indicated by x):    x   2 Self-Retainers      O-Arm with Stealth gustavog      Fito Biopsy trephine set w/ K-wire & pituitary rongeurs     Small pituitary ronlitour      Fito's angled curettes, narrow " shaft      Bone graft, kapner gouges      Midas Balaji Med[employer]onic gian, electric motor      Phenol 5%      [place] BMAC stem cell      Vancomycin 1 gram powder      Zometa 4 mg vials      Depo Medrol steroid      Blunt Pelvic Retractor (.55, Blunt Hohmann with  slight bend)      (1) Portable hand held radiation detector machine for sentinel node biopsy and (2) Lymphazurin      [place] Osteotomes      Specimens and cultures (indicated by x):      Tissue cultures, aerobic and anaerobic without gram stain     Frozen section   X  pathology specimens - fresh      pathology specimens - formalin

## 2020-09-30 NOTE — TELEPHONE ENCOUNTER
FUTURE VISIT INFORMATION      SURGERY INFORMATION:    Date: 10/6/20    Location: [facility] OR    Surgeon:  Willie Payton MD     Anesthesia Type:  General    Procedure: Excisional biopsy soft tissue mass Left tricep     Consult: ov 8/13/20    RECORDS REQUESTED FROM:       Primary Care Provider: Jeffery Johnson MD - [facility]

## 2020-10-01 ENCOUNTER — ANESTHESIA EVENT (OUTPATIENT)
Dept: SURGERY | Facility: CLINIC | Age: 60
End: 2020-10-01
Payer: COMMERCIAL

## 2020-10-01 ENCOUNTER — PRE VISIT (OUTPATIENT)
Dept: SURGERY | Facility: CLINIC | Age: 60
End: 2020-10-01

## 2020-10-01 ENCOUNTER — VIRTUAL VISIT (OUTPATIENT)
Dept: SURGERY | Facility: CLINIC | Age: 60
End: 2020-10-01
Payer: COMMERCIAL

## 2020-10-01 ENCOUNTER — AMBULATORY - HEALTHEAST (OUTPATIENT)
Dept: FAMILY MEDICINE | Facility: CLINIC | Age: 60
End: 2020-10-01

## 2020-10-01 ENCOUNTER — RECORDS - HEALTHEAST (OUTPATIENT)
Dept: ADMINISTRATIVE | Facility: OTHER | Age: 60
End: 2020-10-01

## 2020-10-01 DIAGNOSIS — Z01.818 PREOP EXAMINATION: Primary | ICD-10-CM

## 2020-10-01 DIAGNOSIS — Z11.59 ENCOUNTER FOR SCREENING FOR OTHER VIRAL DISEASES: ICD-10-CM

## 2020-10-01 PROCEDURE — 99203 OFFICE O/P NEW LOW 30 MIN: CPT | Mod: 95 | Performed by: PHYSICIAN ASSISTANT

## 2020-10-01 ASSESSMENT — ENCOUNTER SYMPTOMS: SEIZURES: 0

## 2020-10-01 ASSESSMENT — PAIN SCALES - GENERAL: PAINLEVEL: NO PAIN (0)

## 2020-10-01 ASSESSMENT — LIFESTYLE VARIABLES: TOBACCO_USE: 0

## 2020-10-01 NOTE — H&P
Pre-Operative H & P     CC:  Preoperative exam to assess for increased cardiopulmonary risk while undergoing surgery and anesthesia.    Date of Encounter: 10/1/2020  Primary Care Physician:  Jeffery Johnson  Associated Diagnosis: soft tissue mass, left upper arm    HPI  Carl Desai is a 60 year old male who presents via video for pre-operative H & P in preparation for Excisional biopsy soft tissue mass Left tricep with Dr. Payton on 10/6/2020 at [facility]. General anesthesia.    This is a 60 year old male with an unremarkable PMH.  He has h/o soft tissue mass of the left tricep for approximately 1 year.  It has grown in size. MRI prompted referral to ortho oncology.   He then underwent biopsy in February but sampling was limited - atypical tumor vs. low grade sarcoma.  Final diagnosis pending evaluation of entire resected specimen.  He denies any cardiopulmonary history, bleeding/clotting disorders.  He is not on any anticoagulation.  The above procedure is planned.     History is obtained from the patient and the medical record.    Past Medical History  Past Medical History:   Diagnosis Date     Disorder of bursae and tendons in shoulder region 4/30/2020    Created by Conversion  Replacement Utility updated for latest IMO load     History of torn meniscus of right knee      Knee pain, acute 12/26/2014     Overweight 4/30/2020    Created by Conversion     Sinusitis 2/15/2016       Past Surgical History  Past Surgical History:   Procedure Laterality Date     KNEE SURGERY  February, 2015    Torn Meniscus       Hx of Blood transfusions/reactions: denies     Hx of abnormal bleeding or anti-platelet use: denies    Menstrual history: No LMP for male patient.:     Steroid use in the last year: denies    Personal or FH with difficulty with Anesthesia:  denies    Prior to Admission Medications  No current outpatient medications on file.       Allergies  No Known  Allergies    Social History  Social History     Socioeconomic History     Marital status:      Spouse name: Not on file     Number of children: Not on file     Years of education: Not on file     Highest education level: Not on file   Occupational History     Not on file   Social Needs     Financial resource strain: Not on file     Food insecurity     Worry: Not on file     Inability: Not on file     Transportation needs     Medical: Not on file     Non-medical: Not on file   Tobacco Use     Smoking status: Never Smoker     Smokeless tobacco: Current User     Types: Snuff   Substance and Sexual Activity     Alcohol use: Yes     Drug use: Never     Sexual activity: Yes     Partners: Female   Lifestyle     Physical activity     Days per week: Not on file     Minutes per session: Not on file     Stress: Not on file   Relationships     Social connections     Talks on phone: Not on file     Gets together: Not on file     Attends Yazidi service: Not on file     Active member of club or organization: Not on file     Attends meetings of clubs or organizations: Not on file     Relationship status: Not on file     Intimate partner violence     Fear of current or ex partner: Not on file     Emotionally abused: Not on file     Physically abused: Not on file     Forced sexual activity: Not on file   Other Topics Concern     Not on file   Social History Narrative     Not on file       Family History  Family History   Problem Relation Age of Onset     Cerebrovascular Disease Mother      Hypertension Mother      Heart Disease Father      Kidney Disease Father      Hypertension Father            Anesthesia Evaluation     . Pt has had prior anesthetic.     No history of anesthetic complications          ROS/MED HX    ENT/Pulmonary:  - neg pulmonary ROS    (-) tobacco use   Neurologic:  - neg neurologic ROS    (-) seizures and CVA   Cardiovascular:  - neg cardiovascular ROS   (+) ----. : . . . :. . No previous cardiac  testing       METS/Exercise Tolerance:  >4 METS   Hematologic:  - neg hematologic  ROS      (-) history of blood clots and History of Transfusion   Musculoskeletal: Comment: knees  (+) arthritis,  -       GI/Hepatic:  - neg GI/hepatic ROS       Renal/Genitourinary:  - ROS Renal section negative       Endo:  - neg endo ROS       Psychiatric:  - neg psychiatric ROS       Infectious Disease:  - neg infectious disease ROS       Malignancy:      - no malignancy   Other:    (+) no H/O Chronic Pain,  - neg other ROS     The complete review of systems is negative other than noted in the HPI or here.        Physical Exam    Please refer to the physical examination documented by the anesthesiologist in the anesthesia record on the day of surgery      Constitutional: Awake, alert, cooperative, no apparent distress, and appears stated age.   Respiratory: non-labored breathing  Neurologic: Awake, alert, oriented to name, place and time. Neuropsychiatric: Calm, cooperative. Normal affect.     Exam: MRI of the left humerus dated 8/5/2020  IMPRESSION:  1. Soft tissue mass centered within the long head of the triceps  muscle, in the left upper extremity, within the mid to distal left  upper extremity. The soft tissue mass measures approximately 7.5 x 4.8  x 3.5 cm. The mass is predominantly fatty, however there are linear  areas of low T1 and increased T2 signal, with enhancement.  Considerations include an atypical lipomatous tumor versus a low-grade  liposarcoma, however definitive diagnosis with histopathology.  2. No marrow signal abnormalities to suggest fracture, osteonecrosis,  or marrow infiltration.     Outside records reviewed from: care everywhere    ASSESSMENT and PLAN  Carl Desai is a 60 year old male scheduled for Excisional biopsy soft tissue mass Left tricep on 10/6/2020 by Dr. Payton in treatment of soft tissue mass.  PAC referral for risk assessment and optimization for anesthesia:    Pre-operative  considerations:  1.  Cardiac:  Functional status- METS >4.  Intermediate risk surgery with 0.4% (RCRI #) risk of major adverse cardiac event.  No cardiac history.  2.  Pulm:  Airway feasible.  YVONNE risk: Intermediate. Never smoker.  3.  GI:  Risk of PONV score = 2.  If > 2, anti-emetic intervention recommended.  4.  Hgb, K, Cr ordered for DOS    VTE risk: 1.8-3%    Patient is optimized and is acceptable candidate for the proposed procedure.  No further diagnostic evaluation is needed.           Video-Visit Details    Type of service:  Video Visit    Patient verbally consented to video service today: YES      Video Start Time: 0716  Video End Time (time video stopped): 0725    Originating Location (pt. Location): Home    Distant Location (provider location): Home      Mode of Communication:  Video Conference via Rose Kruger PA-C  Preoperative Assessment Center  [facility]  Clinic and Surgery Center  Phone: [phone]  Fax: [phone]

## 2020-10-01 NOTE — PROGRESS NOTES
"Carl Desai is a 60 year old male who is being evaluated via a billable video visit.      The patient has been notified of following:     \"This video visit will be conducted via a call between you and your physician/provider. We have found that certain health care needs can be provided without the need for an in-person physical exam.  This service lets us provide the care you need with a video conversation.  If a prescription is necessary we can send it directly to your pharmacy.  If lab work is needed we can place an order for that and you can then stop by our lab to have the test done at a later time.    Video visits are billed at different rates depending on your insurance coverage.  Please reach out to your insurance provider with any questions.    If during the course of the call the physician/provider feels a video visit is not appropriate, you will not be charged for this service.\"    Patient has given verbal consent for Video visit? Yes  How would you like to obtain your AVS? MyChart    Will anyone else be joining your video visit? No        JOSH Li LPN          "

## 2020-10-01 NOTE — ANESTHESIA PREPROCEDURE EVALUATION
"Anesthesia Pre-Procedure Evaluation    Patient: Carl Desai   MRN:     [id number] Gender:   male   Age:    60 year old :      1960        Preoperative Diagnosis: * No surgery found *        LABS:  CBC: No results found for: WBC, HGB, HCT, PLT  BMP: No results found for: NA, POTASSIUM, CHLORIDE, CO2, BUN, CR, GLC  COAGS: No results found for: PTT, INR, FIBR  POC: No results found for: BGM, HCG, HCGS  OTHER: No results found for: PH, LACT, A1C, RC, PHOS, MAG, ALBUMIN, PROTTOTAL, ALT, AST, GGT, ALKPHOS, BILITOTAL, BILIDIRECT, LIPASE, AMYLASE, TYRA, TSH, T4, T3, CRP, SED     Preop Vitals    BP Readings from Last 3 Encounters:   No data found for BP    Pulse Readings from Last 3 Encounters:   No data found for Pulse      Resp Readings from Last 3 Encounters:   No data found for Resp    SpO2 Readings from Last 3 Encounters:   No data found for SpO2      Temp Readings from Last 1 Encounters:   No data found for Temp    Ht Readings from Last 1 Encounters:   20 1.803 m (5' 11\")      Wt Readings from Last 1 Encounters:   20 98.5 kg (217 lb 3.2 oz)    Estimated body mass index is 30.29 kg/m  as calculated from the following:    Height as of 20: 1.803 m (5' 11\").    Weight as of 20: 98.5 kg (217 lb 3.2 oz).     LDA:        Past Medical History:   Diagnosis Date     Disorder of bursae and tendons in shoulder region 2020    Created by Conversion  Replacement Utility updated for latest IMO load     History of torn meniscus of right knee      Knee pain, acute 2014     Overweight 2020    Created by Conversion     Sinusitis 2/15/2016      Past Surgical History:   Procedure Laterality Date     KNEE SURGERY      Torn Meniscus      No Known Allergies     Anesthesia Evaluation     . Pt has had prior anesthetic.     No history of anesthetic complications          ROS/MED HX    ENT/Pulmonary:  - neg pulmonary ROS    (-) tobacco use   Neurologic:  - neg neurologic ROS    (-) " seizures and CVA   Cardiovascular:  - neg cardiovascular ROS   (+) ----. : . . . :. . No previous cardiac testing       METS/Exercise Tolerance:  >4 METS   Hematologic:  - neg hematologic  ROS      (-) history of blood clots and History of Transfusion   Musculoskeletal: Comment: knees  (+) arthritis,  -       GI/Hepatic:  - neg GI/hepatic ROS       Renal/Genitourinary:  - ROS Renal section negative       Endo:  - neg endo ROS       Psychiatric:  - neg psychiatric ROS       Infectious Disease:  - neg infectious disease ROS       Malignancy:      - no malignancy   Other:    (+) no H/O Chronic Pain,  - neg other ROS                     PHYSICAL EXAM:   Mental Status/Neuro: A/A/O   Airway: Facies: Feasible (facial hair)  Mallampati: I  Mouth/Opening: Full  TM distance: > 6 cm  Neck ROM: Full   Respiratory: Auscultation: CTAB     Resp. Rate: Normal     Resp. Effort: Normal      CV: Rhythm: Regular   Comments:      Dental: Normal Dentition                Assessment:   ASA SCORE: 2            Plan:   Anes. Type:  General   Pre-Medication: None   Induction:  IV (Standard)   Airway: LMA   Access/Monitoring: PIV   Maintenance: Balanced     Postop Plan:   Postop Pain: Opioids  Postop Sedation/Airway: Not planned  Disposition: Outpatient     PONV Management:   Adult Risk Factors:, Postop Opioids   Prevention: Ondansetron, Dexamethasone                PAC Discussion and Assessment    ASA Classification: 2  Case is suitable for: [facility]  Anesthetic techniques and relevant risks discussed: GA  Invasive monitoring and risk discussed: No  Types:   Possibility and Risk of blood transfusion discussed: No  NPO instructions given:   Additional anesthetic preparation and risks discussed:   Needs early admission to pre-op area:   Other:     PAC Resident/NP Anesthesia Assessment:  Carl Desai is a 60 year old male scheduled for Excisional biopsy soft tissue mass Left tricep on 10/6/2020 by Dr. Payton in treatment of soft tissue mass.   PAC referral for risk assessment and optimization for anesthesia:    Pre-operative considerations:  1.  Cardiac:  Functional status- METS >4.  Intermediate risk surgery with 0.4% (RCRI #) risk of major adverse cardiac event.  No cardiac history.  2.  Pulm:  Airway feasible.  YVONNE risk: Intermediate. Never smoker.  3.  GI:  Risk of PONV score = 2.  If > 2, anti-emetic intervention recommended.    VTE risk: 1.8-3%    Patient is optimized and is acceptable candidate for the proposed procedure.  No further diagnostic evaluation is needed.         **For further details of assessment, testing, and physical exam please see H and P completed on same date.          Marychuy Kruger PA-C, [facility]      Reviewed and Signed by PAC Mid-Level Provider/Resident  Mid-Level Provider/Resident: Marychuy Kruger  Date: 10/1/2020  Time:     Attending Anesthesiologist Anesthesia Assessment:        Anesthesiologist:   Date:   Time:   Pass/Fail:   Disposition:     PAC Pharmacist Assessment:        Pharmacist:   Date:   Time:    Marychuy Kruger PA-C

## 2020-10-01 NOTE — PATIENT INSTRUCTIONS
Preparing for Your Surgery      Name:  Carl Desai   MRN:  [id number]   :  1960   Today's Date:  10/1/2020       Arriving for surgery:  Surgery date:  10/6/2020  Arrival time:  6AM    Restrictions due to COVID 19:  Patients are allowed one visitor in the pre-op period  All visitors must wear a mask  No visitors under 18  No ill visitors   parking is not available     Please come to:     [facility] [address]  [address] [place] [address]. [place]  [address]    -Proceed to the 3rd floor, check in at the Adult Surgery Waiting Lounge. [phone]    If an escort is needed stop at the Information Desk in the lobby. Inform the information person that you are here for surgery. An escort to the Adult Surgery Waiting Lounge will be provided.        What can I eat or drink?  -  You may eat and drink normally for up to 8 hours before your surgery. (Until Midnight)  -  You may have clear liquids until 2 1/2 hours before surgery. (Until 10/6/2020, 6AM)  Examples of clear liquids:  Water  Clear broth  Juices (apple, white grape, white cranberry  and cider) without pulp  Noncarbonated, powder based beverages  (lemonade and Rafa-Aid)  Sodas (Sprite, 7-Up, ginger ale and seltzer)  Coffee or tea (without milk or cream)  Gatorade    -  No Alcohol for at least 24 hours before surgery     Which medicines can I take?    Hold Aspirin for 7 days before surgery.   Hold Multivitamins for 7 days before surgery.  Hold Supplements for 7 days before surgery.  Hold Ibuprofen (Advil, Motrin) for 1 day before surgery--unless otherwise directed by surgeon.  Hold Naproxen (Aleve) for 4 days before surgery.      How do I prepare myself?  - Please shower the evening before and the morning of surgery using Scrubcare or Hibiclens soap.    Use this soap only from the neck to your toes.     Leave the soap on your skin for one minute--then rinse thoroughly.      You may use your own shampoo and conditioner; no other  hair products.   - Please remove all jewelry and body piercings.  - No lotions, deodorants or fragrance.  - Bring your ID and insurance card.    - All patients are required to have a Covid-19 test within 4 days of surgery/procedure.      -Patients will be contacted by the [facility] scheduling team within 1 week of surgery to make an appointment.      - Patients may call the Scheduling team at [phone] if they have not been scheduled within 4 days of  surgery.      ALL PATIENTS GOING HOME THE SAME DAY OF SURGERY ARE REQUIRED TO HAVE A RESPONSIBLE ADULT TO DRIVE AND BE IN ATTENDANCE WITH THEM FOR 24 HOURS FOLLOWING SURGERY     Questions or Concerns:    - For any questions regarding the day of surgery or your hospital stay, please contact the Pre Admission Nursing Office at [phone].       - If you have health changes between today and your surgery please call your surgeon.       For questions after surgery please call your surgeons office.

## 2020-10-03 ENCOUNTER — AMBULATORY - HEALTHEAST (OUTPATIENT)
Dept: FAMILY MEDICINE | Facility: CLINIC | Age: 60
End: 2020-10-03

## 2020-10-03 DIAGNOSIS — Z11.59 ENCOUNTER FOR SCREENING FOR OTHER VIRAL DISEASES: ICD-10-CM

## 2020-10-05 ENCOUNTER — COMMUNICATION - HEALTHEAST (OUTPATIENT)
Dept: SCHEDULING | Facility: CLINIC | Age: 60
End: 2020-10-05

## 2020-10-06 ENCOUNTER — ANESTHESIA (OUTPATIENT)
Dept: SURGERY | Facility: CLINIC | Age: 60
End: 2020-10-06
Payer: COMMERCIAL

## 2020-10-06 ENCOUNTER — HOSPITAL ENCOUNTER (OUTPATIENT)
Facility: CLINIC | Age: 60
Discharge: HOME | End: 2020-10-06
Attending: ORTHOPAEDIC SURGERY | Admitting: ORTHOPAEDIC SURGERY
Payer: COMMERCIAL

## 2020-10-06 VITALS
RESPIRATION RATE: 11 BRPM | HEIGHT: 71 IN | TEMPERATURE: 97.3 F | OXYGEN SATURATION: 96 % | WEIGHT: 213.85 LBS | HEART RATE: 70 BPM | DIASTOLIC BLOOD PRESSURE: 92 MMHG | BODY MASS INDEX: 29.94 KG/M2 | SYSTOLIC BLOOD PRESSURE: 138 MMHG

## 2020-10-06 DIAGNOSIS — M79.89 SOFT TISSUE MASS: Primary | ICD-10-CM

## 2020-10-06 DIAGNOSIS — M79.89 SOFT TISSUE MASS: ICD-10-CM

## 2020-10-06 LAB
ABO + RH BLD: NORMAL
ABO + RH BLD: NORMAL
BLD GP AB SCN SERPL QL: NORMAL
BLOOD BANK CMNT PATIENT-IMP: NORMAL
CREAT SERPL-MCNC: 1.08 MG/DL (ref 0.66–1.25)
GFR SERPL CREATININE-BSD FRML MDRD: 74 ML/MIN/{1.73_M2}
GLUCOSE BLDC GLUCOMTR-MCNC: 96 MG/DL (ref 70–99)
HGB BLD-MCNC: 14.9 G/DL (ref 13.3–17.7)
POTASSIUM SERPL-SCNC: 3.9 MMOL/L (ref 3.4–5.3)
SPECIMEN EXP DATE BLD: NORMAL

## 2020-10-06 PROCEDURE — 360N000016 HC SURGERY LEVEL 2 1ST 30 MIN - UMMC: Performed by: ORTHOPAEDIC SURGERY

## 2020-10-06 PROCEDURE — 88275 CYTOGENETICS 100-300: CPT | Performed by: PATHOLOGY

## 2020-10-06 PROCEDURE — 88307 TISSUE EXAM BY PATHOLOGIST: CPT | Mod: TC | Performed by: ORTHOPAEDIC SURGERY

## 2020-10-06 PROCEDURE — 84132 ASSAY OF SERUM POTASSIUM: CPT | Performed by: PHYSICIAN ASSISTANT

## 2020-10-06 PROCEDURE — 250N000013 HC RX MED GY IP 250 OP 250 PS 637

## 2020-10-06 PROCEDURE — 86850 RBC ANTIBODY SCREEN: CPT | Performed by: ORTHOPAEDIC SURGERY

## 2020-10-06 PROCEDURE — 272N000001 HC OR GENERAL SUPPLY STERILE: Performed by: ORTHOPAEDIC SURGERY

## 2020-10-06 PROCEDURE — 88307 TISSUE EXAM BY PATHOLOGIST: CPT | Mod: 26 | Performed by: PATHOLOGY

## 2020-10-06 PROCEDURE — 258N000003 HC RX IP 258 OP 636

## 2020-10-06 PROCEDURE — 250N000003 HC SEVOFLURANE, EA 15 MIN: Performed by: ORTHOPAEDIC SURGERY

## 2020-10-06 PROCEDURE — 250N000011 HC RX IP 250 OP 636

## 2020-10-06 PROCEDURE — 370N000001 HC ANESTHESIA TECHNICAL FEE, 1ST 30 MIN: Performed by: ORTHOPAEDIC SURGERY

## 2020-10-06 PROCEDURE — 88342 IMHCHEM/IMCYTCHM 1ST ANTB: CPT | Mod: 26 | Performed by: PATHOLOGY

## 2020-10-06 PROCEDURE — 82947 ASSAY GLUCOSE BLOOD QUANT: CPT | Performed by: ANESTHESIOLOGY

## 2020-10-06 PROCEDURE — 999N001017 HC STATISTIC GLUCOSE BY METER IP

## 2020-10-06 PROCEDURE — 999N000139 HC STATISTIC PRE-PROCEDURE ASSESSMENT II: Performed by: ORTHOPAEDIC SURGERY

## 2020-10-06 PROCEDURE — 88341 IMHCHEM/IMCYTCHM EA ADD ANTB: CPT | Mod: TC | Performed by: ORTHOPAEDIC SURGERY

## 2020-10-06 PROCEDURE — 88342 IMHCHEM/IMCYTCHM 1ST ANTB: CPT | Mod: TC | Performed by: ORTHOPAEDIC SURGERY

## 2020-10-06 PROCEDURE — 999N001020 HC STATISTIC H-SEND OUTS PREP: Performed by: ORTHOPAEDIC SURGERY

## 2020-10-06 PROCEDURE — 85018 HEMOGLOBIN: CPT | Performed by: PHYSICIAN ASSISTANT

## 2020-10-06 PROCEDURE — 88341 IMHCHEM/IMCYTCHM EA ADD ANTB: CPT | Mod: 26 | Performed by: PATHOLOGY

## 2020-10-06 PROCEDURE — 761N000003 HC RECOVERY PHASE 1 LEVEL 2 FIRST HR: Performed by: ORTHOPAEDIC SURGERY

## 2020-10-06 PROCEDURE — 370N000002 HC ANESTHESIA TECHNICAL FEE, EACH ADDTL 15 MIN: Performed by: ORTHOPAEDIC SURGERY

## 2020-10-06 PROCEDURE — 36415 COLL VENOUS BLD VENIPUNCTURE: CPT | Performed by: PHYSICIAN ASSISTANT

## 2020-10-06 PROCEDURE — 360N000017 HC SURGERY LEVEL 2 EA 15 ADDTL MIN - UMMC: Performed by: ORTHOPAEDIC SURGERY

## 2020-10-06 PROCEDURE — 250N000009 HC RX 250

## 2020-10-06 PROCEDURE — 82565 ASSAY OF CREATININE: CPT | Performed by: PHYSICIAN ASSISTANT

## 2020-10-06 PROCEDURE — 88271 CYTOGENETICS DNA PROBE: CPT | Performed by: PATHOLOGY

## 2020-10-06 PROCEDURE — 250N000013 HC RX MED GY IP 250 OP 250 PS 637: Performed by: STUDENT IN AN ORGANIZED HEALTH CARE EDUCATION/TRAINING PROGRAM

## 2020-10-06 PROCEDURE — 86900 BLOOD TYPING SEROLOGIC ABO: CPT | Performed by: ORTHOPAEDIC SURGERY

## 2020-10-06 PROCEDURE — 761N000007 HC RECOVERY PHASE 2 EACH 15 MINS: Performed by: ORTHOPAEDIC SURGERY

## 2020-10-06 PROCEDURE — 250N000011 HC RX IP 250 OP 636: Performed by: ORTHOPAEDIC SURGERY

## 2020-10-06 PROCEDURE — 86901 BLOOD TYPING SEROLOGIC RH(D): CPT | Performed by: ORTHOPAEDIC SURGERY

## 2020-10-06 RX ORDER — LABETALOL 20 MG/4 ML (5 MG/ML) INTRAVENOUS SYRINGE
10
Status: DISCONTINUED | OUTPATIENT
Start: 2020-10-06 | End: 2020-10-06 | Stop reason: HOSPADM

## 2020-10-06 RX ORDER — ONDANSETRON 4 MG/1
4 TABLET, ORALLY DISINTEGRATING ORAL EVERY 30 MIN PRN
Status: DISCONTINUED | OUTPATIENT
Start: 2020-10-06 | End: 2020-10-06 | Stop reason: HOSPADM

## 2020-10-06 RX ORDER — HYDROCODONE BITARTRATE AND ACETAMINOPHEN 5; 325 MG/1; MG/1
1-2 TABLET ORAL EVERY 4 HOURS PRN
Qty: 10 TABLET | Refills: 0 | Status: SHIPPED | OUTPATIENT
Start: 2020-10-06 | End: 2020-10-06

## 2020-10-06 RX ORDER — ONDANSETRON 2 MG/ML
4 INJECTION INTRAMUSCULAR; INTRAVENOUS EVERY 30 MIN PRN
Status: DISCONTINUED | OUTPATIENT
Start: 2020-10-06 | End: 2020-10-06 | Stop reason: HOSPADM

## 2020-10-06 RX ORDER — SODIUM CHLORIDE, SODIUM LACTATE, POTASSIUM CHLORIDE, CALCIUM CHLORIDE 600; 310; 30; 20 MG/100ML; MG/100ML; MG/100ML; MG/100ML
INJECTION, SOLUTION INTRAVENOUS CONTINUOUS
Status: DISCONTINUED | OUTPATIENT
Start: 2020-10-06 | End: 2020-10-06 | Stop reason: HOSPADM

## 2020-10-06 RX ORDER — CEFAZOLIN SODIUM 1 G/3ML
1 INJECTION, POWDER, FOR SOLUTION INTRAMUSCULAR; INTRAVENOUS SEE ADMIN INSTRUCTIONS
Status: DISCONTINUED | OUTPATIENT
Start: 2020-10-06 | End: 2020-10-06 | Stop reason: HOSPADM

## 2020-10-06 RX ORDER — NALOXONE HYDROCHLORIDE 0.4 MG/ML
.1-.4 INJECTION, SOLUTION INTRAMUSCULAR; INTRAVENOUS; SUBCUTANEOUS
Status: DISCONTINUED | OUTPATIENT
Start: 2020-10-06 | End: 2020-10-06 | Stop reason: HOSPADM

## 2020-10-06 RX ORDER — ACETAMINOPHEN 325 MG/1
650 TABLET ORAL EVERY 4 HOURS PRN
Qty: 50 TABLET | Refills: 0 | Status: SHIPPED | OUTPATIENT
Start: 2020-10-06 | End: 2020-10-06

## 2020-10-06 RX ORDER — FENTANYL CITRATE 50 UG/ML
INJECTION, SOLUTION INTRAMUSCULAR; INTRAVENOUS PRN
Status: DISCONTINUED | OUTPATIENT
Start: 2020-10-06 | End: 2020-10-06

## 2020-10-06 RX ORDER — SODIUM CHLORIDE, SODIUM LACTATE, POTASSIUM CHLORIDE, CALCIUM CHLORIDE 600; 310; 30; 20 MG/100ML; MG/100ML; MG/100ML; MG/100ML
INJECTION, SOLUTION INTRAVENOUS CONTINUOUS PRN
Status: DISCONTINUED | OUTPATIENT
Start: 2020-10-06 | End: 2020-10-06

## 2020-10-06 RX ORDER — CEFAZOLIN SODIUM 2 G/100ML
2 INJECTION, SOLUTION INTRAVENOUS
Status: COMPLETED | OUTPATIENT
Start: 2020-10-06 | End: 2020-10-06

## 2020-10-06 RX ORDER — HYDROMORPHONE HYDROCHLORIDE 1 MG/ML
.3-.5 INJECTION, SOLUTION INTRAMUSCULAR; INTRAVENOUS; SUBCUTANEOUS EVERY 5 MIN PRN
Status: DISCONTINUED | OUTPATIENT
Start: 2020-10-06 | End: 2020-10-06 | Stop reason: HOSPADM

## 2020-10-06 RX ORDER — FENTANYL CITRATE 50 UG/ML
25-50 INJECTION, SOLUTION INTRAMUSCULAR; INTRAVENOUS
Status: DISCONTINUED | OUTPATIENT
Start: 2020-10-06 | End: 2020-10-06 | Stop reason: HOSPADM

## 2020-10-06 RX ORDER — HYDROCODONE BITARTRATE AND ACETAMINOPHEN 5; 325 MG/1; MG/1
1-2 TABLET ORAL EVERY 4 HOURS PRN
Qty: 10 TABLET | Refills: 0 | Status: SHIPPED | OUTPATIENT
Start: 2020-10-06 | End: 2021-05-20

## 2020-10-06 RX ORDER — DEXAMETHASONE SODIUM PHOSPHATE 4 MG/ML
INJECTION, SOLUTION INTRA-ARTICULAR; INTRALESIONAL; INTRAMUSCULAR; INTRAVENOUS; SOFT TISSUE PRN
Status: DISCONTINUED | OUTPATIENT
Start: 2020-10-06 | End: 2020-10-06

## 2020-10-06 RX ORDER — HYDROXYZINE HYDROCHLORIDE 10 MG/1
10 TABLET, FILM COATED ORAL
Status: DISCONTINUED | OUTPATIENT
Start: 2020-10-06 | End: 2020-10-06 | Stop reason: HOSPADM

## 2020-10-06 RX ORDER — ONDANSETRON 4 MG/1
4 TABLET, ORALLY DISINTEGRATING ORAL
Status: DISCONTINUED | OUTPATIENT
Start: 2020-10-06 | End: 2020-10-06 | Stop reason: HOSPADM

## 2020-10-06 RX ORDER — PROPOFOL 10 MG/ML
INJECTION, EMULSION INTRAVENOUS PRN
Status: DISCONTINUED | OUTPATIENT
Start: 2020-10-06 | End: 2020-10-06

## 2020-10-06 RX ORDER — MEPERIDINE HYDROCHLORIDE 25 MG/ML
12.5 INJECTION INTRAMUSCULAR; INTRAVENOUS; SUBCUTANEOUS
Status: DISCONTINUED | OUTPATIENT
Start: 2020-10-06 | End: 2020-10-06 | Stop reason: HOSPADM

## 2020-10-06 RX ORDER — AMOXICILLIN 250 MG
1-2 CAPSULE ORAL 2 TIMES DAILY
Qty: 30 TABLET | Refills: 0 | Status: SHIPPED | OUTPATIENT
Start: 2020-10-06 | End: 2020-10-06

## 2020-10-06 RX ORDER — HYDROCODONE BITARTRATE AND ACETAMINOPHEN 5; 325 MG/1; MG/1
1 TABLET ORAL
Status: COMPLETED | OUTPATIENT
Start: 2020-10-06 | End: 2020-10-06

## 2020-10-06 RX ORDER — AMOXICILLIN 250 MG
1-2 CAPSULE ORAL 2 TIMES DAILY
Qty: 30 TABLET | Refills: 0 | Status: SHIPPED | OUTPATIENT
Start: 2020-10-06 | End: 2021-05-20

## 2020-10-06 RX ORDER — ACETAMINOPHEN 325 MG/1
650 TABLET ORAL EVERY 4 HOURS PRN
Qty: 50 TABLET | Refills: 0 | Status: SHIPPED | OUTPATIENT
Start: 2020-10-06 | End: 2021-05-20

## 2020-10-06 RX ORDER — ONDANSETRON 2 MG/ML
4 INJECTION INTRAMUSCULAR; INTRAVENOUS EVERY 30 MIN PRN
Status: DISCONTINUED | OUTPATIENT
Start: 2020-10-06 | End: 2020-10-06

## 2020-10-06 RX ORDER — ACETAMINOPHEN 325 MG/1
975 TABLET ORAL ONCE
Status: COMPLETED | OUTPATIENT
Start: 2020-10-06 | End: 2020-10-06

## 2020-10-06 RX ORDER — LABETALOL HYDROCHLORIDE 5 MG/ML
10 INJECTION, SOLUTION INTRAVENOUS
Status: DISCONTINUED | OUTPATIENT
Start: 2020-10-06 | End: 2020-10-06 | Stop reason: HOSPADM

## 2020-10-06 RX ORDER — LIDOCAINE HYDROCHLORIDE 20 MG/ML
INJECTION, SOLUTION INFILTRATION; PERINEURAL PRN
Status: DISCONTINUED | OUTPATIENT
Start: 2020-10-06 | End: 2020-10-06

## 2020-10-06 RX ORDER — HYDROMORPHONE HYDROCHLORIDE 1 MG/ML
.3-.5 INJECTION, SOLUTION INTRAMUSCULAR; INTRAVENOUS; SUBCUTANEOUS EVERY 10 MIN PRN
Status: DISCONTINUED | OUTPATIENT
Start: 2020-10-06 | End: 2020-10-06 | Stop reason: HOSPADM

## 2020-10-06 RX ORDER — ONDANSETRON 4 MG/1
4 TABLET, ORALLY DISINTEGRATING ORAL EVERY 30 MIN PRN
Status: DISCONTINUED | OUTPATIENT
Start: 2020-10-06 | End: 2020-10-06

## 2020-10-06 RX ADMIN — CEFAZOLIN 2 G: 10 INJECTION, POWDER, FOR SOLUTION INTRAVENOUS at 08:34

## 2020-10-06 RX ADMIN — HYDROCODONE BITARTRATE AND ACETAMINOPHEN 1 TABLET: 5; 325 TABLET ORAL at 11:21

## 2020-10-06 RX ADMIN — ACETAMINOPHEN 975 MG: 325 TABLET, FILM COATED ORAL at 07:01

## 2020-10-06 RX ADMIN — LIDOCAINE HYDROCHLORIDE 100 MG: 20 INJECTION, SOLUTION INFILTRATION; PERINEURAL at 08:28

## 2020-10-06 RX ADMIN — SODIUM CHLORIDE, POTASSIUM CHLORIDE, SODIUM LACTATE AND CALCIUM CHLORIDE: 600; 310; 30; 20 INJECTION, SOLUTION INTRAVENOUS at 10:20

## 2020-10-06 RX ADMIN — FENTANYL CITRATE 100 MCG: 50 INJECTION, SOLUTION INTRAMUSCULAR; INTRAVENOUS at 08:28

## 2020-10-06 RX ADMIN — PROPOFOL 180 MG: 10 INJECTION, EMULSION INTRAVENOUS at 08:28

## 2020-10-06 RX ADMIN — DEXAMETHASONE SODIUM PHOSPHATE 6 MG: 4 INJECTION, SOLUTION INTRAMUSCULAR; INTRAVENOUS at 08:50

## 2020-10-06 RX ADMIN — SODIUM CHLORIDE, POTASSIUM CHLORIDE, SODIUM LACTATE AND CALCIUM CHLORIDE: 600; 310; 30; 20 INJECTION, SOLUTION INTRAVENOUS at 08:21

## 2020-10-06 ASSESSMENT — MIFFLIN-ST. JEOR: SCORE: 1797.13

## 2020-10-06 NOTE — BRIEF OP NOTE
[facility]     Brief Operative Note    Pre-operative diagnosis: Soft tissue mass [M79.89] left long head of triceps   Post-operative diagnosis Same as pre-operative diagnosis    Procedure: Procedure(s):  Excisional biopsy soft tissue mass Left tricep  Surgeon: Surgeon(s) and Role:     * Willie Payton MD - Primary     * Jackie Curry MD - Resident - Assisting  Anesthesia: General   Estimated blood loss: Less than 10 ml  Drains: None  Specimens:   ID Type Source Tests Collected by Time Destination   A : Left Tricep Mass Tissue Arm, Left SURGICAL PATHOLOGY EXAM Willie Payton MD 10/6/2020  8:06 AM      Findings:   Posterior cutaneous branch of radial nerve visualized. 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 Tumor borders investing into muscle belly. .  Complications: None.  Implants: * No implants in log *    Plan:  Ortho Primary  Activity: Up with assist.  Weight bearing status: Weight bearing as tolerated LUE.  Antibiotics: Ancef x24 hours perioperatively.   Diet: Begin with clear fluids and progress diet as tolerated.  DVT prophylaxis: mechanical  Bracing/Splinting: sling LUE for comfort.  Elevation: Elevate LUE on pillows to keep up as much as possible.  Wound Care: dressings on x7 days  Pain management: transition from IV to orals as tolerated.   X-rays: none   PT/OT: ROMAT, ADL's.  Labs: Trend Hgb on POD #1.  Pathology: follow permanent path   Consults: PT, OT. hospitalist, appreciate assistance in caring for this patient.  Follow-up: Clinic with Dr. Payton in 2 weeks. No xrays.    Disposition: Pending pain control on orals, and medical stability, anticipate discharge to home on POD #0 from PACU.    Jackie Curry MD   Orthopedic Surgery, PGY4  [phone]

## 2020-10-06 NOTE — ANESTHESIA POSTPROCEDURE EVALUATION
Anesthesia POST Procedure Evaluation    Patient: Carl Desai   MRN:     [id number] Gender:   male   Age:    60 year old :      1960        Preoperative Diagnosis: Soft tissue mass [M79.89]   Procedure(s):  Excisional biopsy soft tissue mass Left tricep   Postop Comments: No value filed.     Anesthesia Type: General          Postop Pain Control: Uneventful            Sign Out: Well controlled pain   PONV: No   Neuro/Psych: Uneventful            Sign Out: Acceptable/Baseline neuro status   Airway/Respiratory: Uneventful            Sign Out: Acceptable/Baseline resp. status   CV/Hemodynamics: Uneventful            Sign Out: Acceptable CV status   Other NRE: NONE   DID A NON-ROUTINE EVENT OCCUR? No         Last Anesthesia Record Vitals:  CRNA VITALS  10/6/2020 0946 - 10/6/2020 1046      10/6/2020             Resp Rate (observed):  (!) 3          Last PACU Vitals:  Vitals Value Taken Time   /96 10/06/20 1100   Temp 36.3  C (97.3  F) 10/06/20 1050   Pulse 70 10/06/20 1105   Resp 11 10/06/20 1106   SpO2 97 % 10/06/20 1107   Temp src     NIBP     Pulse     SpO2     Resp     Temp     Ht Rate     Temp 2     Vitals shown include unvalidated device data.      Electronically Signed By: Jewel Valdez DO, 2020, 1:27 PM

## 2020-10-06 NOTE — OP NOTE
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 Marginal excision lipomatous tumor left long head of triceps muscle belly    Primary Surgeon: Dr. 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  He is indicated for marginal excision of this mass given close proximity to neurovascular structures, especially his radial nerve.    We discussed non-operative and operative treatment options and they elected to proceed with surgery.     The risks, benefits, and alternatives were explained to the patient. They understand that the risks include, but are not limited to anesthesia complications such as stroke, heart attack, and death. Surgical risks include bleeding, infection, pain, scar, need for re-operation, recurrence, damage to surrounding structures including nerves and vessels, DVT, PE. We specifically discussed the risk of injury to nearby neurovascular structures including his radial and ulnar nerves and vessels.     They understand that there is a risk of loss of life, loss of limb, and loss of function. No guarantees were given or implied.  The patient demonstrated  understanding of the risks, benefits and alternatives.  All questions were answered satisfactorily. They consented to proceed.     Description of the Procedure:   The patient was met in the preoperative holding area on the day of the procedure where the operative site was identified and marked with indelible ink according to hospital policy.  Written consent was confirmed.  Patient was then brought back to the operating theater with anesthesia and positioned decubitus lateral with a beanbag and axillary roll on the operating table.  Sequential compression devices were placed on bilateral lower extremities.  All bony prominences were well-padded.  Straps were used to secure the patient to the operating table.  General anesthesia was induced.  A sterile tourniquet was made available was not used.  The operative site was then prepped and draped in usual sterile fashion. Antibiotics were confirmed to have been infused.    A surgical timeout was then conducted according hospital policy confirming correct patient, procedure, extremity and laterality.  All in the room were in agreement, and this was concordant with the surgical site marking and displayed imaging in the room.    The procedure first commenced with a longitudinal incision over the soft tissue mass located midway along the posterior humerus.  Bovie cautery and bipolar cautery were used to maintain satisfactory hemostasis throughout the case.  Dissection was carried through the subdermal and subcutaneous layer investing proximal fascia.  A fasciotomy was then performed just medial to midline in order to avoid the identified posterior cutaneous nerve of the arm.  The nerve was retracted and protected throughout the case.  The long triceps muscle was then identified.  A deep knife was used to split the long head of triceps muscle in line with its fibers onto the encapsulating tissue of the tumor.  Tenotomies were then used to carefully dissect the soft lobular  tumor away from the surrounding muscle fibers of the long head of the triceps.  Identified vessels were tied off using 3-0 silk stitches or coagulated with bipolar cautery.  Notably, the tumor was noted to invest into the muscle fibers with irregular borders along the proximal and medial borders.  The mass was then sent in formalin to pathology for permanent pathologic review.    Satisfactory hemostasis was confirmed.  There was no grossly apparent remnant of tumor remaining.  2-0 Vicryl was used to approximate the fascia encapsulating the long head of the triceps muscle belly.  2-0 Vicryl was then used to close the subcutaneous layer.  Running 3-0 PDS was used to close the skin.  Steri-Strips were applied with sterile dressings thereafter.  A sling was placed.      All sponge and needle counts were correct at the end of the case.  Dr. Payton was present and scrubbed for all critical portions of the case.  There were no apparent complications at the end the case. The patient was then awakened from general anesthesia and brought to the postanesthesia care unit in good condition for recovery.     Postoperative Plan:   Patient may be weightbearing as tolerated to left upper extremity.  Sling was placed for comfort.  Patient will be discharged home from PACU if medically cleared and pain controlled.  Short dose of Norco prescribed.  Ancef given perioperatively.  No antibiotics at discharge.  Dressings to remain in place for 7 days.  Then okay to be removed.  Diet to be advanced to normal.  Patient will return to clinic in 2 weeks time for wound check.  Permanent tissue pathology to be followed for tissue diagnosis.    Jackie Curry MD  Resident Surgeon, PGY-4  Orthopedic Surgery     Attending MD (Dr. Willie Payton) Attestation:  I was present during the key portions of the procedure and I was immediately available for the entire procedure between opening and closing.    Willie Payton MD  [facility]  Professor  Oncology and Adult Reconstructive Surgery  Dept Orthopaedic Surgery, [facility]

## 2020-10-06 NOTE — ANESTHESIA CARE TRANSFER NOTE
Patient: Carl Desai    Procedure(s):  Excisional biopsy soft tissue mass Left tricep    Diagnosis: Soft tissue mass [M79.89]  Diagnosis Additional Information: No value filed.    Anesthesia Type:   General     Note:  Airway :Face Mask  Patient transferred to:PACU  Comments: VSS. Breathing spontaneously at a regular rate with adequate tidal volumes and maintaining O2 sats on 4L face mask. Denies nausea or pain. No apparent complications from anesthesia.     Jean Dean MD, MSc.  Anesthesia Resident, [place]-3  Handoff Report: Identifed the Patient, Identified the Reponsible Provider, Reviewed the pertinent medical history, Discussed the surgical course, Reviewed Intra-OP anesthesia mangement and issues during anesthesia, Set expectations for post-procedure period and Allowed opportunity for questions and acknowledgement of understanding      Vitals: (Last set prior to Anesthesia Care Transfer)    CRNA VITALS  10/6/2020 0946 - 10/6/2020 1022      10/6/2020             Resp Rate (observed):  (!) 3                Electronically Signed By: Jean Dean MD  October 6, 2020  10:22 AM

## 2020-10-06 NOTE — DISCHARGE INSTRUCTIONS
Same-Day Surgery   Adult Discharge Orders & Instructions     For 24 hours after surgery:  1. Get plenty of rest.  A responsible adult must stay with you for at least 24 hours after you leave the hospital.   2. Pain medication can slow your reflexes. Do not drive or use heavy equipment.  If you have weakness or tingling, don't drive or use heavy equipment until this feeling goes away.  3. Mixing alcohol and pain medication can cause dizziness and slow your breathing. It can even be fatal. Do not drink alcohol while taking pain medication.  4. Avoid strenuous or risky activities.  Ask for help when climbing stairs.   5. You may feel lightheaded.  If so, sit for a few minutes before standing.  Have someone help you get up.   6. If you have nausea (feel sick to your stomach), drink only clear liquids such as apple juice, ginger ale, broth or 7-Up.  Rest may also help.  Be sure to drink enough fluids.  Move to a regular diet as you feel able. Take pain medications with a small amount of solid food, such as toast or crackers, to avoid nausea.   7. A slight fever is normal. Call the doctor if your fever is over 100 F (37.7 C) (taken under the tongue) or lasts longer than 24 hours.  8. You may have a dry mouth, muscle aches, trouble sleeping or a sore throat.  These symptoms should go away after 24 hours.  9. Do not make important or legal decisions.   Pain Management:      1. Take pain medication (if prescribed) for pain as directed by your physician.        2. WARNING: If the pain medication you have been prescribed contains Tylenol  (acetaminophen), DO NOT take additional doses of Tylenol (acetaminophen).     Call your doctor for any of the followin.  Signs of infection (fever, growing tenderness at the surgery site, severe pain, a large amount of drainage or bleeding, foul-smelling drainage, redness, swelling).    2.  It has been over 8 to 10 hours since surgery and you are still not able to urinate (pee).    3.   Headache for over 24 hours.    4.  Numbness, tingling or weakness the day after surgery (if you had spinal anesthesia).  To contact a doctor, call Dr. Payton's clinic at [phone]  or:      [phone] and ask for the Resident On Call for:          Orthopedic Surgery (answered 24 hours a day)      Emergency Department:  [facility] [place] [facility]: [phone]  [place] [facility]: [phone]               Rev. 10/2014

## 2020-10-13 LAB
COPATH REPORT: NORMAL
COPATH REPORT: NORMAL

## 2020-10-14 NOTE — RESULT ENCOUNTER NOTE
Dear Mr. Desai:    The pathology report came out and confirmed presence of a lipomatous tumor.  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  This confirms our preoperative diagnosis that we discussed.  I can answer any questions that you might have at your next clinic visit.    Regards,    Willie Payton MD  10/13/2020  9:02 PM

## 2020-10-18 ASSESSMENT — ENCOUNTER SYMPTOMS
MYALGIAS: 0
STIFFNESS: 1
MUSCLE WEAKNESS: 0
JOINT SWELLING: 0
ARTHRALGIAS: 1
NECK PAIN: 0
MUSCLE CRAMPS: 0
BACK PAIN: 0

## 2020-10-19 ENCOUNTER — OFFICE VISIT (OUTPATIENT)
Dept: ORTHOPEDICS | Facility: CLINIC | Age: 60
End: 2020-10-19
Payer: COMMERCIAL

## 2020-10-19 ENCOUNTER — RECORDS - HEALTHEAST (OUTPATIENT)
Dept: ADMINISTRATIVE | Facility: OTHER | Age: 60
End: 2020-10-19

## 2020-10-19 DIAGNOSIS — D17.9: Primary | ICD-10-CM

## 2020-10-19 PROCEDURE — 99024 POSTOP FOLLOW-UP VISIT: CPT | Performed by: ORTHOPAEDIC SURGERY

## 2020-10-19 NOTE — NURSING NOTE
Chief Complaint   Patient presents with     Surgical Followup     2wk POP F/u Excisional biopsy soft tissue mass Left tricep - Left DOS: 10/06/2020       60 year old  1960          [id number] - [place], [place] - [address] [place] [address] E AT [place] [address] & [facility]    No Known Allergies    Current Outpatient Medications   Medication     acetaminophen (TYLENOL) 325 MG tablet     HYDROcodone-acetaminophen (NORCO) 5-325 MG tablet     senna-docusate (SENOKOT-S/PERICOLACE) 8.6-50 MG tablet     No current facility-administered medications for this visit.

## 2020-10-19 NOTE — LETTER
10/19/2020         RE: Carl Desai  [address]  [facility] [address]        Dear Colleague,    Thank you for referring your patient, Carl Desai, to the [facility] [place]. Please see a copy of my visit note below.        [facility] Physicians, Orthopaedic Oncology Surgery Consultation  by Willie Payton M.D.    Carl Desai MRN# [id number]   Age: 59 year old YOB: 1960     Requesting physician: Dr. Martin Moreau, general surgeon, [facility]  Dr. Jeffery Johnson, [facility] PCP   DX:   1. Pleomorphic lipoma left triceps muscle, decompression of left radial nerve    SURGERY:  1. 2/17/2020, US guided core needle biopsy, [facility]. 4 cores sent for cytology, atypical cells noted, c/w pleomorphic lipoma  2. 10/6/2020, marginal excision of lipomatous tumor left triceps long head muscle disease (Fito) [facility]           Assessment and Plan:   Assessment:  I reviewed the pathology report of a pleomorphic sarcoma with left triceps muscle.  It has been completely excised.  Likelihood of recurrence is small.    Plan:  Follow-up for virtual visit in 6 months with MRI examination of left arm before hand to assess triceps muscle for completeness of excision.  If MRI is unremarkable at that time, no further follow-up is necessary.           History of Present Illness:   59 year old male  chief complaint  Patient returns for follow-up check after undergoing excision of his soft tissue tumor of the left triceps muscle.  Radial nerve was decompressed.  Upon complete excision of the mass a diagnosis of pleomorphic lipoma has been confirmed.         Physical Exam:     EXAMINATION pertinent findings:   PSYCH: Pleasant, healthy-appearing, alert, oriented x3, cooperative. Normal mood and affect.  VITAL SIGNS: There were no vitals taken for this visit..  Reviewed nursing intake notes.   There is no height or weight on file to calculate BMI.  RESP: non labored  breathing     Musculoskeletal:  Radial nerve median nerve and ulnar function is normal.  Incision is healed satisfactorily.  No complications.  Full motion of the shoulder and elbow.  And wrist.         Data:   All laboratory data reviewed  All imaging studies reviewed by luke Payton MD [facility] Family Professor  Oncology and Adult Reconstructive Surgery  Dept Orthopaedic Surgery, [facility]  [phone] office, [phone] pager  [email or web address]    Answers for HPI/ROS submitted by the patient on 10/18/2020   General Symptoms: No  Skin Symptoms: No  HENT Symptoms: No  EYE SYMPTOMS: No  HEART SYMPTOMS: No  LUNG SYMPTOMS: No  INTESTINAL SYMPTOMS: No  URINARY SYMPTOMS: No  REPRODUCTIVE SYMPTOMS: No  SKELETAL SYMPTOMS: Yes  BLOOD SYMPTOMS: No  NERVOUS SYSTEM SYMPTOMS: No  MENTAL HEALTH SYMPTOMS: No  Back pain: No  Muscle aches: No  Neck pain: No  Swollen joints: No  Joint pain: Yes  Bone pain: No  Muscle cramps: No  Muscle weakness: No  Joint stiffness: Yes  Bone fracture: No

## 2020-10-19 NOTE — PROGRESS NOTES
[facility] Physicians, Orthopaedic Oncology Surgery Consultation  by Willie Payton M.D.    Carl Desai MRN# [id number]   Age: 59 year old YOB: 1960     Requesting physician: Dr. Martin Moreau, general surgeon, [facility]  Dr. Jeffery Johnson, [facility] PCP   DX:   1. Pleomorphic lipoma left triceps muscle, decompression of left radial nerve    SURGERY:  1. 2/17/2020, US guided core needle biopsy, [facility]. 4 cores sent for cytology, atypical cells noted, c/w pleomorphic lipoma  2. 10/6/2020, marginal excision of lipomatous tumor left triceps long head muscle disease (Fito) [facility]           Assessment and Plan:   Assessment:  I reviewed the pathology report of a pleomorphic sarcoma with left triceps muscle.  It has been completely excised.  Likelihood of recurrence is small.    Plan:  Follow-up for virtual visit in 6 months with MRI examination of left arm before hand to assess triceps muscle for completeness of excision.  If MRI is unremarkable at that time, no further follow-up is necessary.           History of Present Illness:   59 year old male  chief complaint  Patient returns for follow-up check after undergoing excision of his soft tissue tumor of the left triceps muscle.  Radial nerve was decompressed.  Upon complete excision of the mass a diagnosis of pleomorphic lipoma has been confirmed.         Physical Exam:     EXAMINATION pertinent findings:   PSYCH: Pleasant, healthy-appearing, alert, oriented x3, cooperative. Normal mood and affect.  VITAL SIGNS: There were no vitals taken for this visit..  Reviewed nursing intake notes.   There is no height or weight on file to calculate BMI.  RESP: non labored breathing     Musculoskeletal:  Radial nerve median nerve and ulnar function is normal.  Incision is healed satisfactorily.  No complications.  Full motion of the shoulder and elbow.  And wrist.         Data:   All laboratory data reviewed  All imaging studies reviewed  by me         MD Javed Aceves Family Professor  Oncology and Adult Reconstructive Surgery  Dept Orthopaedic Surgery, [facility]  [phone] office, [phone] pager  [email or web address]    Answers for HPI/ROS submitted by the patient on 10/18/2020   General Symptoms: No  Skin Symptoms: No  HENT Symptoms: No  EYE SYMPTOMS: No  HEART SYMPTOMS: No  LUNG SYMPTOMS: No  INTESTINAL SYMPTOMS: No  URINARY SYMPTOMS: No  REPRODUCTIVE SYMPTOMS: No  SKELETAL SYMPTOMS: Yes  BLOOD SYMPTOMS: No  NERVOUS SYSTEM SYMPTOMS: No  MENTAL HEALTH SYMPTOMS: No  Back pain: No  Muscle aches: No  Neck pain: No  Swollen joints: No  Joint pain: Yes  Bone pain: No  Muscle cramps: No  Muscle weakness: No  Joint stiffness: Yes  Bone fracture: No

## 2020-12-02 ENCOUNTER — RECORDS - HEALTHEAST (OUTPATIENT)
Dept: ADMINISTRATIVE | Facility: OTHER | Age: 60
End: 2020-12-02

## 2020-12-12 ENCOUNTER — HEALTH MAINTENANCE LETTER (OUTPATIENT)
Age: 60
End: 2020-12-12

## 2021-04-30 ENCOUNTER — TELEPHONE (OUTPATIENT)
Dept: ORTHOPEDICS | Facility: CLINIC | Age: 61
End: 2021-04-30

## 2021-04-30 DIAGNOSIS — D17.9: Primary | ICD-10-CM

## 2021-04-30 NOTE — PROGRESS NOTES
Call to this RN to discuss MRI to be done now in May and virtual follow-up appt with Dr. Payton.  This RN will place, schedule, and call patient back to confirm.    Ousmane verbalized understanding.    VENUS NapierN, RN  RN Care Coordinator, Dr. Payton  [facility] Orthopedic [facility]

## 2021-04-30 NOTE — TELEPHONE ENCOUNTER
Returned call to Ousmane to confirm MRI appt on 5/19, Dr. Payton virtual appt on 5/20.    Direct number provided should Ousmane need to call with questions/concerns.    Hannah Begum, VENUSN, RN  RN Care Coordinator, Dr. Fito HUANG [facility] Orthopedic [facility]

## 2021-05-10 ENCOUNTER — OFFICE VISIT - HEALTHEAST (OUTPATIENT)
Dept: INTERNAL MEDICINE | Facility: CLINIC | Age: 61
End: 2021-05-10

## 2021-05-10 ENCOUNTER — COMMUNICATION - HEALTHEAST (OUTPATIENT)
Dept: INTERNAL MEDICINE | Facility: CLINIC | Age: 61
End: 2021-05-10

## 2021-05-10 DIAGNOSIS — Z00.00 ROUTINE GENERAL MEDICAL EXAMINATION AT A HEALTH CARE FACILITY: ICD-10-CM

## 2021-05-10 DIAGNOSIS — Z12.11 SCREEN FOR COLON CANCER: ICD-10-CM

## 2021-05-10 LAB
ALBUMIN SERPL-MCNC: 4.2 G/DL (ref 3.5–5)
ALBUMIN UR-MCNC: NEGATIVE G/DL
ALP SERPL-CCNC: 75 U/L (ref 45–120)
ALT SERPL W P-5'-P-CCNC: 26 U/L (ref 0–45)
ANION GAP SERPL CALCULATED.3IONS-SCNC: 11 MMOL/L (ref 5–18)
APPEARANCE UR: CLEAR
AST SERPL W P-5'-P-CCNC: 22 U/L (ref 0–40)
BACTERIA #/AREA URNS HPF: ABNORMAL /[HPF]
BILIRUB SERPL-MCNC: 1.1 MG/DL (ref 0–1)
BILIRUB UR QL STRIP: NEGATIVE
BUN SERPL-MCNC: 17 MG/DL (ref 8–22)
CALCIUM SERPL-MCNC: 9.3 MG/DL (ref 8.5–10.5)
CHLORIDE BLD-SCNC: 104 MMOL/L (ref 98–107)
CHOLEST SERPL-MCNC: 154 MG/DL
CO2 SERPL-SCNC: 27 MMOL/L (ref 22–31)
COLOR UR AUTO: YELLOW
CREAT SERPL-MCNC: 1.3 MG/DL (ref 0.7–1.3)
ERYTHROCYTE [DISTWIDTH] IN BLOOD BY AUTOMATED COUNT: 12.4 % (ref 11–14.5)
FASTING STATUS PATIENT QL REPORTED: YES
GFR SERPL CREATININE-BSD FRML MDRD: 56 ML/MIN/1.73M2
GLUCOSE BLD-MCNC: 100 MG/DL (ref 70–125)
GLUCOSE UR STRIP-MCNC: NEGATIVE MG/DL
HCT VFR BLD AUTO: 44.3 % (ref 40–54)
HDLC SERPL-MCNC: 43 MG/DL
HGB BLD-MCNC: 15 G/DL (ref 14–18)
HGB UR QL STRIP: ABNORMAL
KETONES UR STRIP-MCNC: NEGATIVE MG/DL
LDLC SERPL CALC-MCNC: 102 MG/DL
LEUKOCYTE ESTERASE UR QL STRIP: NEGATIVE
MCH RBC QN AUTO: 29.9 PG (ref 27–34)
MCHC RBC AUTO-ENTMCNC: 33.9 G/DL (ref 32–36)
MCV RBC AUTO: 88 FL (ref 80–100)
NITRATE UR QL: NEGATIVE
PH UR STRIP: 6.5 [PH] (ref 5–8)
PLATELET # BLD AUTO: 247 THOU/UL (ref 140–440)
PMV BLD AUTO: 9.6 FL (ref 7–10)
POTASSIUM BLD-SCNC: 4.4 MMOL/L (ref 3.5–5)
PROT SERPL-MCNC: 6.6 G/DL (ref 6–8)
PSA SERPL-MCNC: 0.5 NG/ML (ref 0–4.5)
RBC # BLD AUTO: 5.01 MILL/UL (ref 4.4–6.2)
RBC #/AREA URNS AUTO: ABNORMAL HPF
SODIUM SERPL-SCNC: 142 MMOL/L (ref 136–145)
SP GR UR STRIP: 1.02 (ref 1–1.03)
SQUAMOUS #/AREA URNS AUTO: ABNORMAL LPF
TRIGL SERPL-MCNC: 44 MG/DL
TSH SERPL DL<=0.005 MIU/L-ACNC: 1.5 UIU/ML (ref 0.3–5)
UROBILINOGEN UR STRIP-ACNC: ABNORMAL
WBC #/AREA URNS AUTO: ABNORMAL HPF
WBC: 5.1 THOU/UL (ref 4–11)

## 2021-05-10 ASSESSMENT — MIFFLIN-ST. JEOR: SCORE: 1817.1

## 2021-05-19 ENCOUNTER — ANCILLARY PROCEDURE (OUTPATIENT)
Dept: MRI IMAGING | Facility: CLINIC | Age: 61
End: 2021-05-19
Attending: ORTHOPAEDIC SURGERY
Payer: COMMERCIAL

## 2021-05-19 DIAGNOSIS — D17.9: ICD-10-CM

## 2021-05-19 PROCEDURE — 73220 MRI UPPR EXTREMITY W/O&W/DYE: CPT | Mod: LT | Performed by: RADIOLOGY

## 2021-05-19 PROCEDURE — A9585 GADOBUTROL INJECTION: HCPCS | Performed by: RADIOLOGY

## 2021-05-19 RX ORDER — GADOBUTROL 604.72 MG/ML
10 INJECTION INTRAVENOUS ONCE
Status: COMPLETED | OUTPATIENT
Start: 2021-05-19 | End: 2021-05-19

## 2021-05-19 RX ADMIN — GADOBUTROL 10 ML: 604.72 INJECTION INTRAVENOUS at 07:23

## 2021-05-19 NOTE — DISCHARGE INSTRUCTIONS
MRI Contrast Discharge Instructions    The IV contrast you received today will pass out of your body in your  urine. This will happen in the next 24 hours. You will not feel this process.  Your urine will not change color.    Drink at least 4 extra glasses of water or juice today (unless your doctor  has restricted your fluids). This reduces the stress on your kidneys.  You may take your regular medicines.    If you are on dialysis: It is best to have dialysis today.    If you have a reaction: Most reactions happen right away. If you have  any new symptoms after leaving the hospital (such as hives or swelling),  call your hospital at the correct number below. Or call your family doctor.  If you have breathing distress or wheezing, call 911.    Special instructions: ***    I have read and understand the above information.    Signature:______________________________________ Date:___________    Staff:__________________________________________ Date:___________     Time:__________    [place] [facility] Departments:    ___[place]: [phone]  ___[facility]: [phone]  ___[place]: [phone] ___[facility]: [phone]  ___[facility]: [phone]  ___[facility]: [phone]  ___[phone]  ___[facility]: [phone]  ___[phone]

## 2021-05-20 ENCOUNTER — RECORDS - HEALTHEAST (OUTPATIENT)
Dept: ADMINISTRATIVE | Facility: OTHER | Age: 61
End: 2021-05-20

## 2021-05-20 ENCOUNTER — VIRTUAL VISIT (OUTPATIENT)
Dept: ORTHOPEDICS | Facility: CLINIC | Age: 61
End: 2021-05-20
Payer: COMMERCIAL

## 2021-05-20 DIAGNOSIS — D17.9: Primary | ICD-10-CM

## 2021-05-20 PROCEDURE — 99213 OFFICE O/P EST LOW 20 MIN: CPT | Mod: 95 | Performed by: ORTHOPAEDIC SURGERY

## 2021-05-20 RX ORDER — MULTIVITAMIN,THERAPEUTIC
1 TABLET ORAL
COMMUNITY
End: 2022-09-20

## 2021-05-20 NOTE — NURSING NOTE
Chief Complaint   Patient presents with     Results     6 Month F/u to Rvw. Results of MRI completed on 05/19/2021     Video Visit     Via Linkage ([place]itt[place])       60 year old  1960        [facility] STORE #[id number] - [place], [place] - [facility] [address] [address] AT [address] [address] & [facility]        No Known Allergies        Current Outpatient Medications   Medication     Multiple Vitamins-Minerals (ONCOVITE) TABS     No current facility-administered medications for this visit.

## 2021-05-20 NOTE — PROGRESS NOTES
[facility] Physicians, Orthopaedic Oncology Surgery Consultation  by Willie Payton M.D.    Carl Desai MRN# [id number]   Age: 59 year old YOB: 1960     Requesting physician: Dr. Martin Moreau, general surgeon, [facility]  Dr. Jeffery Johnson, [facility] PCP   DX:   1. Pleomorphic lipoma left triceps muscle, decompression of left radial nerve    SURGERY:  1. 2/17/2020, US guided core needle biopsy, [facility]. 4 cores sent for cytology, atypical cells noted, c/w pleomorphic lipoma  2. 10/6/2020, marginal excision of lipomatous tumor left triceps long head muscle disease (Fito) [facility]       I met with Mr. Desai for virtual visit today 9 months after he underwent excision of the pleomorphic lipomatous tumor of his left triceps muscle.  He reports doing well without any symptoms whatsoever and has full use of his left upper extremity.  He denies any weakness or tingling in his hand forearm or wrist area.    Examination demonstrates intact triceps function with full extension of the elbow and further flexion to 135 degrees.  No evidence of wrist drop on my virtual examination via video visit today.    MRI examination from May 19, 2021 is reviewed.  I compared it with the original preexcision study as well and showed this to the patient on the virtual visit screen.  There is no evidence of any tumor recurrence in the site of the original tumor growth within the triceps muscle.    Impression:  Excellent outcome with full function of the left upper extremity and no evidence of any tumor relapse at this time.    Plan:  I explained to the patient that the risk for local recurrence is small and I do not feel regularly scheduled surveillance follow-up visits are necessary.  I instructed the patient on how to monitor himself with self-examination looking for evidence of any recurrent masses or swelling or increased circumference of the arm.  If this should develop, I advised him to contact  "us and an MRI examination of the left arm and elbow region should be performed at that time.  He expressed good understanding of these recommendations.    Willie Payton MD Ma[facility] Family Professor  Oncology and Adult Reconstructive Surgery  Dept Orthopaedic Surgery, [facility]  [phone] office, [phone] pager  [email or web address]      Virtual-Visit Details    Carl Desai is a 60 year old male who is being evaluated via a billable video visit.      The patient has been notified of following:     \"This virtual video/telephone visit will be conducted via a call between you and your physician/provider. We have found that certain health care needs can be provided without the need for an in-person physical exam.  This service lets us provide the care you need with a video/telephone conversation.  If a prescription is necessary we can send it directly to your pharmacy.  If lab work is needed we can place an order for that and you can then stop by our lab to have the test done at a later time.    Video/telephone visits are billed at different rates depending on your insurance coverage.  Please reach out to your insurance provider with any questions.    If during the course of the call the physician/provider feels a virtual visit is not appropriate, you will not be charged for this service.\"    Patient has given verbal consent for Virtual visit? YES    Type of service:  Video/telephone Visit    Video/telephone time total duration including visit time, pre and post visit work time: 20 min    Originating Location (pt. Location): Home    Distant Location (provider location):  [facility] ORTHOPEDIC [facility]    Mode of Communication:  Video Conference via [place]   "

## 2021-05-27 VITALS
DIASTOLIC BLOOD PRESSURE: 84 MMHG | WEIGHT: 220 LBS | OXYGEN SATURATION: 97 % | TEMPERATURE: 97 F | SYSTOLIC BLOOD PRESSURE: 138 MMHG | BODY MASS INDEX: 30.8 KG/M2 | HEART RATE: 76 BPM | HEIGHT: 71 IN

## 2021-05-30 VITALS — WEIGHT: 201 LBS | BODY MASS INDEX: 28.14 KG/M2 | HEIGHT: 71 IN

## 2021-06-02 VITALS — WEIGHT: 214 LBS | HEIGHT: 71 IN | BODY MASS INDEX: 29.96 KG/M2

## 2021-06-04 VITALS
WEIGHT: 212 LBS | DIASTOLIC BLOOD PRESSURE: 84 MMHG | HEART RATE: 71 BPM | SYSTOLIC BLOOD PRESSURE: 136 MMHG | OXYGEN SATURATION: 98 % | BODY MASS INDEX: 29.68 KG/M2 | HEIGHT: 71 IN

## 2021-06-04 VITALS — BODY MASS INDEX: 30.24 KG/M2 | WEIGHT: 211.2 LBS | HEIGHT: 70 IN

## 2021-06-05 NOTE — PROGRESS NOTES
Office Visit - Physical    Carl Desai   59 y.o. male    Date of Visit: 2020    Chief Complaint   Patient presents with     Annual Exam     Physical Exam       Subjective: Physical examination.    59-year-old  works for [employer].  Enjoys his work may be retiring toward the end of the year.  His life is going to semi-retire.    Left arm lipoma appears to be slightly larger and harder in consistency.  Suggest [place] surgery consultation with Dr. Martin Barfield in this regard.    Seen at [place] for right knee issues.  X-ray MRI showed arthritis.    Colonoscopy dated May 2, 2011 with Dr. Vane Castañeda [facility] normal.    No known drug allergies non-smoker no call excess.  Exercises regularly.  Enjoys playing hockey.  Formerly [employer] undergrad and [employer] school.  Congratulated.    ROS: A comprehensive review of systems was performed and was otherwise negative    Medications:   Prior to Admission medications    Medication Sig Start Date End Date Taking? Authorizing Provider   cholecalciferol, vitamin D3, 1,000 unit tablet Take 1,000 Units by mouth daily.    PROVIDER, HISTORICAL   MULTIVIT &MINERALS/FERROUS FUM (MULTI VITAMIN ORAL) Take 1 tablet by mouth daily.    PROVIDER, HISTORICAL       Allergies:No Known Allergies    Immunizations:   Immunization History   Administered Date(s) Administered     Tdap 02/15/2011       Health Maintenance: Immunizations reviewed and up-to-date.    Past Medical History: Right knee scope osteoarthritis.    Past Surgical History: No anesthetic complications.    Family History: Mother  in her 80s stroke.    Father  in hospice kidney and heart disease.    2 children well wife well.  Wife anticipates jail she has had arthritis in her spine.    Social History: Enjoys playing hockey exercising less of late.  Busy at work.    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  [employer] .    Exam Chest clear  to auscultation and percussion.  Heart tones regular rhythm without murmur rub or gallop.  Abdomen soft nontender no organomegaly.  No peritoneal signs.  Extremities free of edema cyanosis or clubbing.  Neck veins nondistended no thyromegaly or scleral icterus noted, carotids full.  Skin warm and dry easily conversant good spirited.  Normal intelligence.  Neurologically intact no gross localizing findings.  Lipoma harder larger left arm near the triceps.  Suggest [place] surgery consultation with Dr. Martin Barfield for consideration of removal of same.  Skin negative lymph negative neuro negative psych normal HEENT negative back straight no severe spine tenderness wears a beard not in acute distress not toxic genital rectal exam negative small prostate no nodularity nothing to suggest malignancy good pulse tone in all 4 extremities no carotid bruits thyromegaly.  No lymphadenopathy appreciated lymph bearing areas.    Assessment and Plan  General medical examination check today hemogram comprehensive metabolic profile urinalysis lipid panel PSA TSH.  Suggest consultation with Dr. Martin Barfield at [place] [facility] regarding lipoma left arm triceps area appears more firm harder and perhaps larger.    The following high BMI interventions were performed this visit: encouragement to exercise    Jeffery Johnson MD    Patient Active Problem List   Diagnosis     Overweight     Shoulder Tendonitis     Knee pain, acute     Sinusitis

## 2021-06-10 NOTE — PROGRESS NOTES
Office Visit - Physical    Carl Desai   56 y.o. male    Date of Visit: 2017    Chief Complaint   Patient presents with     Annual Exam     Physical Exam   fasting       Subjective: Physical exam.    56-year-old  for [employer] title here for physical exam.  Jammed left ring finger in December.    Non-smoker no excess alcohol allergies none.    No drug allergies.    For the interphalangeal joint of the index finger left hand I did suggest orthopedic specialty consultation.  X-ray done at emergency room was negative for fracture.    Colonoscopy per Dr. Vane Berry normal May 2, 2011.    ROS: A comprehensive review of systems was performed and was otherwise negative    Medications:   Prior to Admission medications    Medication Sig Start Date End Date Taking? Authorizing Provider   cholecalciferol, vitamin D3, 1,000 unit tablet Take 1,000 Units by mouth daily.   Yes PROVIDER, HISTORICAL   MULTIVIT &MINERALS/FERROUS FUM (MULTI VITAMIN ORAL) Take 1 tablet by mouth daily.   Yes PROVIDER, HISTORICAL       Allergies:No Known Allergies    Immunizations:   Immunization History   Administered Date(s) Administered     Tdap 02/15/2011       Health Maintenance: Immunizations reviewed and up-to-date.    Past Medical History: Right knee scope surgery.  Plus over-the-counter medications.  No prescription items.    Past Surgical History: Right knee scope otherwise negative.    Family History: Mother  of a stroke in her 80s.    Father is in hospice now in his late 80s.    2 children well wife well wife has had some arthritic problems in her spine.    Social History: Enjoys playing in a [employer] hockey league previously played high school hockey.  He is an  for [employer] he enjoys his work.    Exam Chest clear to auscultation and percussion.  Heart tones regular rhythm without murmur rub or gallop.  Abdomen soft nontender no organomegaly.  No peritoneal signs.  Extremities free of  edema cyanosis or clubbing.  Neck veins nondistended no thyromegaly or scleral icterus noted, carotids full.  Skin warm and dry easily conversant good spirited.  Normal intelligence.  Neurologically intact no gross localizing findings.  Rest of exam negative in its entirety including skin negative lymph negative neuro negative psych normal HEENT negative back straight no severe spine tenderness general rectal exam allCLEAR small prostate no carotid bruits or thyromegaly no neck vein distention.  [place] noted left arm discussed appears benign if it increases in size or becomes tender painful suggest general surgery consultation for removal of same.    Assessment and Plan  General medical examination at healthcare facility check hemogram plus comprehensive metabolic profile urinalysis lipid panel PSA TSH.    Lipoma left arm suggest surgical removal if increases in size or becomes tender.  General surgery consult.    The following high BMI interventions were performed this visit: encouragement to exercise    Jeffery Johnson MD    Patient Active Problem List   Diagnosis     Overweight     Shoulder Tendonitis     Knee pain, acute     Sinusitis

## 2021-06-17 NOTE — PROGRESS NOTES
Office Visit - Physical    Carl Desai   60 y.o. male    Date of Visit: 5/10/2021    Chief Complaint   Patient presents with     Annual Exam     Physical Exam   fasting       Subjective: Physical examination.    60-year-old  for [employer] here for physical exam.  Graduate [employer] undergraduate and [employer] school.    Colonoscopy dated May 2, 2011 normal with Dr. Vane Castañeda.    Materna vaccine for COVID-19 prevention has been administered second dose received 2021.    Non-smoker no excess alcohol.  Weight is gone up a bit discussed and encouraged weight loss 10 to 15 pounds.    History of lipoma removed left arm [facility] benign ultimately the diagnosis.  Patient has had imaging studies done at the [facility] no sign of cancer.    ROS: A comprehensive review of systems was performed and was otherwise negative    Medications:   Prior to Admission medications    Medication Sig Start Date End Date Taking? Authorizing Provider   multivitamin therapeutic tablet Take 1 tablet by mouth daily.   Yes PROVIDER, HISTORICAL       Allergies:No Known Allergies    Immunizations:   Immunization History   Administered Date(s) Administered     COVID-19,PF,Moderna 2021, 2021     Tdap 02/15/2011       Health Maintenance: Immunizations reviewed and up-to-date.    Past Medical History: Prior right knee scope.  Lipoma removed left arm.  No anesthetic complications.    Past Surgical History: No anesthetic complications with any prior surgery.    Family History: Mother  in her 80s after a stroke.    Father  uremia and heart disease.  Hospice.    2 children well wife well never retired.    Social History: Previously played hockey no longer does that but played up till last year prior to the Covid pandemic.  Graduate of the [employer] undergraduate and [employer] school.  [employer].      Exam Chest clear to auscultation and  percussion.  Heart tones regular rhythm without murmur rub or gallop.  Abdomen soft nontender no organomegaly.  No peritoneal signs.  Extremities free of edema cyanosis or clubbing.  Neck veins nondistended no thyromegaly or scleral icterus noted, carotids full.  Skin warm and dry easily conversant good spirited.  Normal intelligence.  Neurologically intact no gross localizing findings.  Skin negative lymph negative neuro negative psych normal wears a beard easily conversant good spirited weight is up a bit discussed.  Skin unremarkable few scattered moles suggest dermatology removal.  Nothing suspicious for cancer.  HEENT negative back straight no severe spine tenderness genital rectal exam negative prostate small without nodularity good pulse noted in all 4 extremities.  Centripetal obesity noted.    70-1/2 inches tall 220 pounds up 3 pounds from previous BMI 31+.  Recheck blood pressure 138/84 pulse 76 respirations 18 O2 sats 97% temperature this morning 97.0 degrees.  Suggest repeat colonoscopy as last done May 2, 2011 suggest Dr. EWING at [facility] group [id number].    Assessment and Plan  General medical examination at health care facility recommend colonoscopy with Dr. EWING at [facility] [id number].  Also today hemogram comprehensive metabolic profile urinalysis lipid panel PSA and TSH.    The following high BMI interventions were performed this visit: encouragement to exercise    Jeffery Johnson MD    Patient Active Problem List   Diagnosis     Overweight     Shoulder Tendonitis     Knee pain, acute     Sinusitis

## 2021-06-18 NOTE — LETTER
Letter by Jeffery Johnson MD at      Author: Jeffery Johnson MD Service: -- Author Type: --    Filed:  Encounter Date: 1/28/2019 Status: (Other)       Carl Desai  [address] [place] [id number]             January 28, 2019         Dear Mr. Desai,    Below are the results from your recent visit:    Resulted Orders   HM2(CBC w/o Differential)   Result Value Ref Range    WBC 5.8 4.0 - 11.0 thou/uL    RBC 5.00 4.40 - 6.20 mill/uL    Hemoglobin 15.6 14.0 - 18.0 g/dL    Hematocrit 44.9 40.0 - 54.0 %    MCV 90 80 - 100 fL    MCH 31.1 27.0 - 34.0 pg    MCHC 34.7 32.0 - 36.0 g/dL    RDW 12.8 11.0 - 14.5 %    Platelets 244 140 - 440 thou/uL    MPV 7.6 7.0 - 10.0 fL   Comprehensive Metabolic Panel   Result Value Ref Range    Sodium 142 136 - 145 mmol/L    Potassium 4.4 3.5 - 5.0 mmol/L    Chloride 105 98 - 107 mmol/L    CO2 28 22 - 31 mmol/L    Anion Gap, Calculation 9 5 - 18 mmol/L    Glucose 96 70 - 125 mg/dL    BUN 13 8 - 22 mg/dL    Creatinine 1.21 0.70 - 1.30 mg/dL    GFR MDRD Af Amer >60 >60 mL/min/1.73m2    GFR MDRD Non Af Amer >60 >60 mL/min/1.73m2    Bilirubin, Total 1.5 (H) 0.0 - 1.0 mg/dL    Calcium 9.9 8.5 - 10.5 mg/dL    Protein, Total 7.2 6.0 - 8.0 g/dL    Albumin 4.3 3.5 - 5.0 g/dL    Alkaline Phosphatase 63 45 - 120 U/L    AST 25 0 - 40 U/L    ALT 38 0 - 45 U/L    Narrative    Fasting Glucose reference range is 70-99 mg/dL per  American Diabetes Association (ADA) guidelines.   Lipid Cascade   Result Value Ref Range    Cholesterol 173 <=199 mg/dL    Triglycerides 56 <=149 mg/dL    HDL Cholesterol 53 >=40 mg/dL    LDL Calculated 109 <=129 mg/dL    Patient Fasting > 8hrs? Yes    Thyroid Stimulating Hormone (TSH)   Result Value Ref Range    TSH 1.60 0.30 - 5.00 uIU/mL   Urinalysis-UC if Indicated   Result Value Ref Range    Color, UA Straw Colorless, Yellow, Straw, Light Yellow    Clarity, UA Clear Clear    Glucose, UA Negative Negative    Bilirubin, UA Negative Negative     Ketones, UA Negative Negative    Specific Gravity, UA 1.007 1.001 - 1.030    Blood, UA Negative Negative    pH, UA 6.5 4.5 - 8.0    Protein, UA Negative Negative mg/dL    Urobilinogen, UA <2.0 E.U./dL <2.0 E.U./dL, 2.0 E.U./dL    Nitrite, UA Negative Negative    Leukocytes, UA Negative Negative    Narrative    Microscopic not indicated  UC not indicated   PSA (Prostatic-Specific Antigen), Annual Screen   Result Value Ref Range    PSA 0.4 0.0 - 3.5 ng/mL    Narrative    Method is Abbott Prostate-Specific Antigen (PSA)  Standard-WHO 1st International (90:10)       All very good results    Please call with questions or contact us using [email or web address].    Sincerely,        Electronically signed by Jeffery Johnson MD

## 2021-06-20 NOTE — LETTER
Letter by Jeffery Johnson MD at      Author: Jeffery Johnson MD Service: -- Author Type: --    Filed:  Encounter Date: 1/28/2020 Status: (Other)         Carl Desai  [address] [facility] [address]             January 28, 2020         Dear Mr. Desai,    Below are the results from your recent visit:    Resulted Orders   HM2(CBC w/o Differential)   Result Value Ref Range    WBC 4.6 4.0 - 11.0 thou/uL    RBC 4.85 4.40 - 6.20 mill/uL    Hemoglobin 15.1 14.0 - 18.0 g/dL    Hematocrit 44.1 40.0 - 54.0 %    MCV 91 80 - 100 fL    MCH 31.2 27.0 - 34.0 pg    MCHC 34.3 32.0 - 36.0 g/dL    RDW 11.4 11.0 - 14.5 %    Platelets 223 140 - 440 thou/uL    MPV 7.7 7.0 - 10.0 fL   Comprehensive Metabolic Panel   Result Value Ref Range    Sodium 141 136 - 145 mmol/L    Potassium 4.5 3.5 - 5.0 mmol/L    Chloride 106 98 - 107 mmol/L    CO2 27 22 - 31 mmol/L    Anion Gap, Calculation 8 5 - 18 mmol/L    Glucose 93 70 - 125 mg/dL    BUN 27 (H) 8 - 22 mg/dL    Creatinine 1.08 0.70 - 1.30 mg/dL    GFR MDRD Af Amer >60 >60 mL/min/1.73m2    GFR MDRD Non Af Amer >60 >60 mL/min/1.73m2    Bilirubin, Total 1.1 (H) 0.0 - 1.0 mg/dL    Calcium 9.3 8.5 - 10.5 mg/dL    Protein, Total 6.4 6.0 - 8.0 g/dL    Albumin 4.1 3.5 - 5.0 g/dL    Alkaline Phosphatase 57 45 - 120 U/L    AST 17 0 - 40 U/L    ALT 15 0 - 45 U/L    Narrative    Fasting Glucose reference range is 70-99 mg/dL per  American Diabetes Association (ADA) guidelines.   Lipid Cascade   Result Value Ref Range    Cholesterol 149 <=199 mg/dL    Triglycerides 36 <=149 mg/dL    HDL Cholesterol 45 >=40 mg/dL    LDL Calculated 97 <=129 mg/dL    Patient Fasting > 8hrs? Yes    Thyroid Stimulating Hormone (TSH)   Result Value Ref Range    TSH 1.58 0.30 - 5.00 uIU/mL   Urinalysis-UC if Indicated   Result Value Ref Range    Color, UA Yellow Colorless, Yellow, Straw, Light Yellow    Clarity, UA Clear Clear    Glucose, UA Negative Negative    Bilirubin, UA Negative Negative     Ketones, UA Negative Negative    Specific Gravity, UA 1.025 1.005 - 1.030    Blood, UA Small (!) Negative    pH, UA 6.0 5.0 - 8.0    Protein, UA Negative Negative mg/dL    Urobilinogen, UA 0.2 E.U./dL 0.2 E.U./dL, 1.0 E.U./dL    Nitrite, UA Negative Negative    Leukocytes, UA Negative Negative    Bacteria, UA None Seen None Seen hpf    RBC, UA 0-2 None Seen, 0-2 hpf    WBC, UA None Seen None Seen, 0-5 hpf    Squam Epithel, UA None Seen None Seen, 0-5 lpf    Narrative    UC not indicated   PSA (Prostatic-Specific Antigen), Annual Screen   Result Value Ref Range    PSA 0.4 0.0 - 4.5 ng/mL    Narrative    Method is Abbott Prostate-Specific Antigen (PSA)  Standard-WHO 1st International (90:10)       All very good results    Please call with questions or contact us using [email or web address].    Sincerely,        Electronically signed by Jeffery Johnson MD

## 2021-06-21 NOTE — LETTER
Letter by Jeffery Johnson MD at      Author: Jeffery Johnson MD Service: -- Author Type: --    Filed:  Encounter Date: 5/10/2021 Status: (Other)         Carl Desai  [address] [facility] [id number]             May 10, 2021         Dear Mr. Dseai,    Below are the results from your recent visit:    Resulted Orders   HM2(CBC w/o Differential)   Result Value Ref Range    WBC 5.1 4.0 - 11.0 thou/uL    RBC 5.01 4.40 - 6.20 mill/uL    Hemoglobin 15.0 14.0 - 18.0 g/dL    Hematocrit 44.3 40.0 - 54.0 %    MCV 88 80 - 100 fL    MCH 29.9 27.0 - 34.0 pg    MCHC 33.9 32.0 - 36.0 g/dL    RDW 12.4 11.0 - 14.5 %    Platelets 247 140 - 440 thou/uL    MPV 9.6 7.0 - 10.0 fL   Comprehensive Metabolic Panel   Result Value Ref Range    Sodium 142 136 - 145 mmol/L    Potassium 4.4 3.5 - 5.0 mmol/L    Chloride 104 98 - 107 mmol/L    CO2 27 22 - 31 mmol/L    Anion Gap, Calculation 11 5 - 18 mmol/L    Glucose 100 70 - 125 mg/dL    BUN 17 8 - 22 mg/dL    Creatinine 1.30 0.70 - 1.30 mg/dL    GFR MDRD Af Amer >60 >60 mL/min/1.73m2    GFR MDRD Non Af Amer 56 (L) >60 mL/min/1.73m2    Bilirubin, Total 1.1 (H) 0.0 - 1.0 mg/dL    Calcium 9.3 8.5 - 10.5 mg/dL    Protein, Total 6.6 6.0 - 8.0 g/dL    Albumin 4.2 3.5 - 5.0 g/dL    Alkaline Phosphatase 75 45 - 120 U/L    AST 22 0 - 40 U/L    ALT 26 0 - 45 U/L    Narrative    Fasting Glucose reference range is 70-99 mg/dL per  American Diabetes Association (ADA) guidelines.   Lipid Cascade   Result Value Ref Range    Cholesterol 154 <=199 mg/dL    Triglycerides 44 <=149 mg/dL    HDL Cholesterol 43 >=40 mg/dL    LDL Calculated 102 <=129 mg/dL    Patient Fasting > 8hrs? Yes    Thyroid Stimulating Hormone (TSH)   Result Value Ref Range    TSH 1.50 0.30 - 5.00 uIU/mL   Urinalysis-UC if Indicated   Result Value Ref Range    Color, UA Yellow Colorless, Yellow, Straw, Light Yellow    Clarity, UA Clear Clear    Glucose, UA Negative Negative    Protein, UA Negative Negative     Bilirubin, UA Negative Negative    Urobilinogen, UA 0.2 E.U./dL 0.2 E.U./dL, 1.0 E.U./dL    pH, UA 6.5 5.0 - 8.0    Blood, UA Trace (!) Negative    Ketones, UA Negative Negative    Nitrite, UA Negative Negative    Leukocytes, UA Negative Negative    Specific Gravity, UA 1.020 1.005 - 1.030    RBC, UA None Seen None Seen, 0-2 hpf    WBC, UA None Seen None Seen, 0-5 hpf    Bacteria, UA None Seen None Seen    Squam Epithel, UA None Seen None Seen, 0-5 lpf    Narrative    UC not indicated   PSA (Prostatic-Specific Antigen), Annual Screen   Result Value Ref Range    PSA 0.5 0.0 - 4.5 ng/mL    Narrative    Method is Abbott Prostate-Specific Antigen (PSA)  Standard-WHO 1st International (90:10)       All very good results    Please call with questions or contact us using [email or web address].    Sincerely,        Electronically signed by Jeffery Johnson MD

## 2021-06-23 NOTE — PROGRESS NOTES
Office Visit - Physical    Carl Desai   58 y.o. male    Date of Visit: 2019    Chief Complaint   Patient presents with     Annual Exam     Physical Exam   fasting       Subjective: Physical examination.    58-year-old male here for physical examination.  Recent retinal tear 2018 treated retinal specialist [place] with improved results feels well otherwise.    Colonoscopy normal May 2, 2011 with Dr. Vane Castañeda presiding.    Non-smoker    No excess alcohol.    Allergies none.    He is an  for [employer].  No known drug allergies.  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    ROS: A comprehensive review of systems was performed and was otherwise negative    Medications:   Prior to Admission medications    Medication Sig Start Date End Date Taking? Authorizing Provider   cholecalciferol, vitamin D3, 1,000 unit tablet Take 1,000 Units by mouth daily.    PROVIDER, HISTORICAL   MULTIVIT &MINERALS/FERROUS FUM (MULTI VITAMIN ORAL) Take 1 tablet by mouth daily.    PROVIDER, HISTORICAL       Allergies:No Known Allergies    Immunizations:   Immunization History   Administered Date(s) Administered     Tdap 02/15/2011       Health Maintenance: Immunizations reviewed and up-to-date.    Past Medical History: Right knee scope surgery.    Past Surgical History: Right knee scope surgery otherwise negative.    Family History: Mother  stroke in her 80s.    Positive father  in the hospice with kidney and heart disease.  2 children well wife well some problems with arthritis in her spine.    Social History: Enjoys playing hockey.  Graduate of [employer].    An  for [employer] title.    Exam Chest clear to auscultation and percussion.  Heart tones regular rhythm without murmur rub or gallop.  Abdomen soft nontender no organomegaly.  No peritoneal signs.  Extremities free of edema cyanosis or clubbing.   Neck veins nondistended no thyromegaly or scleral icterus noted, carotids full.  Skin warm and dry easily conversant good spirited.  Normal intelligence.  Neurologically intact no gross localizing findings.  Rest of exam negative in its entirety including negative skin lymph neuropsych HEENT back straight no severe spine tenderness general rectal exam negative good pulse noted in all 4 extremities.  Weight elevated from previous BMI 30+ discussed.  O2 sats 98% respirations 18 pulse 67 and regular blood pressure borderline 138/86 suggest salt restriction regular exercise and weight loss as optimal weight may be closer to 190 pounds.    Assessment and Plan  General medical examination at healthcare facility in 58-year-old male with borderline hypertension.  Suggest hemogram conference of metabolic profile urinalysis lipid panel PSA and TSH.  Also as noted above suggest salt restriction and diet regular exercise caloric restriction and weight loss to optimize blood pressure.  Currently borderline at 138/86.    Retinal tear left eye status post satisfactory laser retinal surgery [place] retinal specialist past December 2018 Pat    The following high BMI interventions were performed this visit: encouragement to exercise    Jeffery Johnson MD    Patient Active Problem List   Diagnosis     Overweight     Shoulder Tendonitis     Knee pain, acute     Sinusitis

## 2021-08-01 ENCOUNTER — HEALTH MAINTENANCE LETTER (OUTPATIENT)
Age: 61
End: 2021-08-01

## 2021-09-26 ENCOUNTER — HEALTH MAINTENANCE LETTER (OUTPATIENT)
Age: 61
End: 2021-09-26

## 2022-08-27 ENCOUNTER — HEALTH MAINTENANCE LETTER (OUTPATIENT)
Age: 62
End: 2022-08-27

## 2022-09-17 ASSESSMENT — ENCOUNTER SYMPTOMS
DIARRHEA: 0
CHILLS: 0
NERVOUS/ANXIOUS: 0
PALPITATIONS: 0
EYE PAIN: 0
HEARTBURN: 0
HEMATURIA: 0
PARESTHESIAS: 0
CONSTIPATION: 0
NAUSEA: 0
MYALGIAS: 0
HEMATOCHEZIA: 0
DYSURIA: 0
ARTHRALGIAS: 0
WEAKNESS: 0
JOINT SWELLING: 0
ABDOMINAL PAIN: 0
FREQUENCY: 1
SHORTNESS OF BREATH: 0
DIZZINESS: 0
COUGH: 0
FEVER: 0
HEADACHES: 0
SORE THROAT: 0

## 2022-09-20 ENCOUNTER — OFFICE VISIT (OUTPATIENT)
Dept: INTERNAL MEDICINE | Facility: CLINIC | Age: 62
End: 2022-09-20
Payer: COMMERCIAL

## 2022-09-20 VITALS
SYSTOLIC BLOOD PRESSURE: 142 MMHG | DIASTOLIC BLOOD PRESSURE: 80 MMHG | HEIGHT: 71 IN | RESPIRATION RATE: 18 BRPM | OXYGEN SATURATION: 99 % | HEART RATE: 61 BPM | WEIGHT: 213.3 LBS | BODY MASS INDEX: 29.86 KG/M2 | TEMPERATURE: 98.2 F

## 2022-09-20 DIAGNOSIS — R31.0 MACROSCOPIC HEMATURIA: Primary | ICD-10-CM

## 2022-09-20 DIAGNOSIS — Z00.00 ROUTINE GENERAL MEDICAL EXAMINATION AT A HEALTH CARE FACILITY: Primary | ICD-10-CM

## 2022-09-20 LAB
ALBUMIN SERPL BCG-MCNC: 4.5 G/DL (ref 3.5–5.2)
ALBUMIN UR-MCNC: NEGATIVE MG/DL
ALP SERPL-CCNC: 60 U/L (ref 40–129)
ALT SERPL W P-5'-P-CCNC: 20 U/L (ref 10–50)
ANION GAP SERPL CALCULATED.3IONS-SCNC: 12 MMOL/L (ref 7–15)
APPEARANCE UR: CLEAR
AST SERPL W P-5'-P-CCNC: 23 U/L (ref 10–50)
BACTERIA #/AREA URNS HPF: ABNORMAL /HPF
BILIRUB SERPL-MCNC: 0.8 MG/DL
BILIRUB UR QL STRIP: NEGATIVE
BUN SERPL-MCNC: 18.8 MG/DL (ref 8–23)
CALCIUM SERPL-MCNC: 9.8 MG/DL (ref 8.8–10.2)
CHLORIDE SERPL-SCNC: 104 MMOL/L (ref 98–107)
CHOLEST SERPL-MCNC: 169 MG/DL
COLOR UR AUTO: YELLOW
CREAT SERPL-MCNC: 1.18 MG/DL (ref 0.67–1.17)
DEPRECATED HCO3 PLAS-SCNC: 25 MMOL/L (ref 22–29)
ERYTHROCYTE [DISTWIDTH] IN BLOOD BY AUTOMATED COUNT: 12.7 % (ref 10–15)
GFR SERPL CREATININE-BSD FRML MDRD: 70 ML/MIN/1.73M2
GLUCOSE SERPL-MCNC: 101 MG/DL (ref 70–99)
GLUCOSE UR STRIP-MCNC: NEGATIVE MG/DL
HCT VFR BLD AUTO: 45.4 % (ref 40–53)
HDLC SERPL-MCNC: 53 MG/DL
HGB BLD-MCNC: 15.5 G/DL (ref 13.3–17.7)
HGB UR QL STRIP: ABNORMAL
KETONES UR STRIP-MCNC: NEGATIVE MG/DL
LDLC SERPL CALC-MCNC: 105 MG/DL
LEUKOCYTE ESTERASE UR QL STRIP: NEGATIVE
MCH RBC QN AUTO: 30.3 PG (ref 26.5–33)
MCHC RBC AUTO-ENTMCNC: 34.1 G/DL (ref 31.5–36.5)
MCV RBC AUTO: 89 FL (ref 78–100)
NITRATE UR QL: NEGATIVE
NONHDLC SERPL-MCNC: 116 MG/DL
PH UR STRIP: 6 [PH] (ref 5–8)
PLATELET # BLD AUTO: 226 10E3/UL (ref 150–450)
POTASSIUM SERPL-SCNC: 4.9 MMOL/L (ref 3.4–5.3)
PROT SERPL-MCNC: 6.7 G/DL (ref 6.4–8.3)
PSA SERPL-MCNC: 0.54 NG/ML (ref 0–4.5)
RBC # BLD AUTO: 5.11 10E6/UL (ref 4.4–5.9)
RBC #/AREA URNS AUTO: ABNORMAL /HPF
SODIUM SERPL-SCNC: 141 MMOL/L (ref 136–145)
SP GR UR STRIP: 1.01 (ref 1–1.03)
SQUAMOUS #/AREA URNS AUTO: ABNORMAL /LPF
TRIGL SERPL-MCNC: 56 MG/DL
TSH SERPL DL<=0.005 MIU/L-ACNC: 3.26 UIU/ML (ref 0.3–4.2)
UROBILINOGEN UR STRIP-ACNC: 0.2 E.U./DL
WBC # BLD AUTO: 5.3 10E3/UL (ref 4–11)
WBC #/AREA URNS AUTO: ABNORMAL /HPF

## 2022-09-20 PROCEDURE — 85027 COMPLETE CBC AUTOMATED: CPT | Performed by: INTERNAL MEDICINE

## 2022-09-20 PROCEDURE — G0103 PSA SCREENING: HCPCS | Performed by: INTERNAL MEDICINE

## 2022-09-20 PROCEDURE — 99396 PREV VISIT EST AGE 40-64: CPT | Performed by: INTERNAL MEDICINE

## 2022-09-20 PROCEDURE — 36415 COLL VENOUS BLD VENIPUNCTURE: CPT | Performed by: INTERNAL MEDICINE

## 2022-09-20 PROCEDURE — 80061 LIPID PANEL: CPT | Performed by: INTERNAL MEDICINE

## 2022-09-20 PROCEDURE — 84443 ASSAY THYROID STIM HORMONE: CPT | Performed by: INTERNAL MEDICINE

## 2022-09-20 PROCEDURE — 80053 COMPREHEN METABOLIC PANEL: CPT | Performed by: INTERNAL MEDICINE

## 2022-09-20 PROCEDURE — 81001 URINALYSIS AUTO W/SCOPE: CPT | Performed by: INTERNAL MEDICINE

## 2022-09-20 ASSESSMENT — PAIN SCALES - GENERAL: PAINLEVEL: NO PAIN (0)

## 2022-09-20 NOTE — PROGRESS NOTES
Office Visit - Physical    Carl Desai   61 year old male    Date of Visit: 2022    Chief Complaint   Patient presents with     Physical       Subjective: Physical examination for this 61-year-old male returning from [employer] [facility].  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    Non-smoker alcohol intake social no drug allergies.  Feels well.    ROS: A comprehensive review of systems was performed and was otherwise negative    Medications:   Prior to Admission medications    Not on File       Allergies:No Known Allergies    Immunizations:   Immunization History   Administered Date(s) Administered     COVID-19,PF,Moderna 2021, 2021, 2021, 2022     TDAP Vaccine (Adacel) 02/15/2011       Health Maintenance: Immunizations reviewed and up-to-date colonoscopy up-to-date.  Exercises regularly.  Former  high school collegiate level.    Past Medical History: No regular medications past health history has been good.  He has noted a slight increase in weight discussed the importance of caloric restriction regular exercise.  Change in portion size.    Past Surgical History: Right knee arthroscopic surgery for torn meniscus.  Lipoma removal left arm benign laser eye surgery.    No anesthetic complications with any prior surgeries.    Family History: There is no family history of malignant hyperthermia associate with general anesthesia.  Mother  80 stroke.    Mother  87 heart disease.  2 children well 1 grandson well wife well.    Social History: Exercises regularly.  Enjoys playing hockey.  Less of late.    Exam not in acute distress vital signs are stable he is afebrile he notes that his blood pressure has been tending upward.  Discussed.  Salt restriction weight loss regular exercise may help no meds for now.  No history of target organ damage related to hypertension.    Head normocephalic eyes ears nose throat exam negative.  Back straight no severe spine  tenderness chest clear lungs clear no rales rhonchi wheezes skin negative lymph negative neuro negative back negative no CVA or spine tenderness no SI joint tenderness neck veins are nondistended there are no carotid bruits there is no lymphadenopathy appreciated lymph bearing areas heart tones normal without murmur rub or gallop rhythm is regular.  Abdomen benign no organomegaly masses or tenderness noted genital examination was negative testicular scrotal exam normal no testicular masses no groin hernias rectal showed a small prostate right and left lobes normal without nodularity or induration nothing to suggest malignancy extremities are free of edema cyanosis or clubbing distal pulses both lower extremities were intact and strong.    He appeared well easily conversant good spirited not in acute distress does not appear chronically or acutely ill.  Intelligent.    Assessment and Plan  General medical examination at health care facility for this 61-year-old  from [employer].  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  Today check hemogram comprehensive metabolic profile urinalysis lipid panel PSA TSH.  We did discuss the importance of caloric restriction regular exercise and the need to lose weight and to watch blood pressure closely.  Colonoscopy and other health maintenance issues like vaccinations were reviewed and up-to-date.    The following high BMI interventions were performed this visit: encouragement to exercise    Jeffery Johnson MD    Patient Active Problem List   Diagnosis     Disorder of bursae and tendons in shoulder region     Knee pain, acute     Overweight     Sinusitis     Soft tissue mass     Answers for HPI/ROS submitted by the patient on 9/17/2022  Frequency of exercise:: 2-3 days/week  Getting at least 3 servings of Calcium per day:: Yes  Diet:: Regular (no restrictions)  Taking medications regularly:: Yes  Medication side effects:: Not applicable  Bi-annual eye  exam:: Yes  Dental care twice a year:: NO  Sleep apnea or symptoms of sleep apnea:: None  abdominal pain: No  Blood in stool: No  Blood in urine: No  chest pain: No  chills: No  congestion: No  constipation: No  cough: No  diarrhea: No  dizziness: No  ear pain: No  eye pain: No  nervous/anxious: No  fever: No  frequency: Yes  genital sores: No  headaches: No  hearing loss: No  heartburn: No  arthralgias: No  joint swelling: No  peripheral edema: No  mood changes: No  myalgias: No  nausea: No  dysuria: No  palpitations: No  Skin sensation changes: No  sore throat: No  urgency: No  rash: No  shortness of breath: No  visual disturbance: No  weakness: No  impotence: No  penile discharge: No  Additional concerns today:: No  Duration of exercise:: 15-30 minutes

## 2022-10-27 ENCOUNTER — TRANSFERRED RECORDS (OUTPATIENT)
Dept: HEALTH INFORMATION MANAGEMENT | Facility: CLINIC | Age: 62
End: 2022-10-27

## 2022-12-28 ENCOUNTER — TRANSFERRED RECORDS (OUTPATIENT)
Dept: HEALTH INFORMATION MANAGEMENT | Facility: CLINIC | Age: 62
End: 2022-12-28

## 2023-01-08 ENCOUNTER — HEALTH MAINTENANCE LETTER (OUTPATIENT)
Age: 63
End: 2023-01-08

## 2023-07-17 ENCOUNTER — TRANSFERRED RECORDS (OUTPATIENT)
Dept: HEALTH INFORMATION MANAGEMENT | Facility: CLINIC | Age: 63
End: 2023-07-17
Payer: COMMERCIAL

## 2023-09-24 ENCOUNTER — HEALTH MAINTENANCE LETTER (OUTPATIENT)
Age: 63
End: 2023-09-24

## 2023-10-27 ASSESSMENT — ENCOUNTER SYMPTOMS
ABDOMINAL PAIN: 0
HEMATOCHEZIA: 0
COUGH: 0
FEVER: 0
MYALGIAS: 0
SHORTNESS OF BREATH: 0
ARTHRALGIAS: 1
DIARRHEA: 0
NAUSEA: 0
NERVOUS/ANXIOUS: 0
JOINT SWELLING: 0
EYE PAIN: 0
PARESTHESIAS: 0
CHILLS: 0
DIZZINESS: 0
FREQUENCY: 1
HEMATURIA: 0
HEADACHES: 0
DYSURIA: 0
PALPITATIONS: 0
CONSTIPATION: 0
HEARTBURN: 0
SORE THROAT: 0
WEAKNESS: 0

## 2023-10-31 ENCOUNTER — OFFICE VISIT (OUTPATIENT)
Dept: INTERNAL MEDICINE | Facility: CLINIC | Age: 63
End: 2023-10-31
Payer: COMMERCIAL

## 2023-10-31 VITALS
WEIGHT: 214.6 LBS | TEMPERATURE: 98.8 F | SYSTOLIC BLOOD PRESSURE: 152 MMHG | BODY MASS INDEX: 30.04 KG/M2 | HEIGHT: 71 IN | DIASTOLIC BLOOD PRESSURE: 85 MMHG | OXYGEN SATURATION: 98 % | RESPIRATION RATE: 20 BRPM | HEART RATE: 67 BPM

## 2023-10-31 DIAGNOSIS — Z12.11 SCREEN FOR COLON CANCER: ICD-10-CM

## 2023-10-31 DIAGNOSIS — Z00.00 ROUTINE GENERAL MEDICAL EXAMINATION AT A HEALTH CARE FACILITY: Primary | ICD-10-CM

## 2023-10-31 LAB
ALBUMIN SERPL BCG-MCNC: 4.3 G/DL (ref 3.5–5.2)
ALBUMIN UR-MCNC: NEGATIVE MG/DL
ALP SERPL-CCNC: 64 U/L (ref 40–129)
ALT SERPL W P-5'-P-CCNC: 21 U/L (ref 0–70)
ANION GAP SERPL CALCULATED.3IONS-SCNC: 9 MMOL/L (ref 7–15)
APPEARANCE UR: CLEAR
AST SERPL W P-5'-P-CCNC: 23 U/L (ref 0–45)
BACTERIA #/AREA URNS HPF: NORMAL /HPF
BILIRUB SERPL-MCNC: 0.7 MG/DL
BILIRUB UR QL STRIP: NEGATIVE
BUN SERPL-MCNC: 20 MG/DL (ref 8–23)
CALCIUM SERPL-MCNC: 9.6 MG/DL (ref 8.8–10.2)
CHLORIDE SERPL-SCNC: 104 MMOL/L (ref 98–107)
CHOLEST SERPL-MCNC: 153 MG/DL
COLOR UR AUTO: YELLOW
CREAT SERPL-MCNC: 1.21 MG/DL (ref 0.67–1.17)
DEPRECATED HCO3 PLAS-SCNC: 28 MMOL/L (ref 22–29)
EGFRCR SERPLBLD CKD-EPI 2021: 67 ML/MIN/1.73M2
ERYTHROCYTE [DISTWIDTH] IN BLOOD BY AUTOMATED COUNT: 12.5 % (ref 10–15)
GLUCOSE SERPL-MCNC: 105 MG/DL (ref 70–99)
GLUCOSE UR STRIP-MCNC: NEGATIVE MG/DL
HCT VFR BLD AUTO: 44.1 % (ref 40–53)
HDLC SERPL-MCNC: 48 MG/DL
HGB BLD-MCNC: 15.2 G/DL (ref 13.3–17.7)
HGB UR QL STRIP: ABNORMAL
KETONES UR STRIP-MCNC: NEGATIVE MG/DL
LDLC SERPL CALC-MCNC: 96 MG/DL
LEUKOCYTE ESTERASE UR QL STRIP: NEGATIVE
MCH RBC QN AUTO: 31.3 PG (ref 26.5–33)
MCHC RBC AUTO-ENTMCNC: 34.5 G/DL (ref 31.5–36.5)
MCV RBC AUTO: 91 FL (ref 78–100)
NITRATE UR QL: NEGATIVE
NONHDLC SERPL-MCNC: 105 MG/DL
PH UR STRIP: 7 [PH] (ref 5–8)
PLATELET # BLD AUTO: 231 10E3/UL (ref 150–450)
POTASSIUM SERPL-SCNC: 5.1 MMOL/L (ref 3.4–5.3)
PROT SERPL-MCNC: 6.8 G/DL (ref 6.4–8.3)
PSA SERPL DL<=0.01 NG/ML-MCNC: 0.59 NG/ML (ref 0–4.5)
RBC # BLD AUTO: 4.85 10E6/UL (ref 4.4–5.9)
RBC #/AREA URNS AUTO: NORMAL /HPF
SODIUM SERPL-SCNC: 141 MMOL/L (ref 135–145)
SP GR UR STRIP: 1.01 (ref 1–1.03)
SQUAMOUS #/AREA URNS AUTO: NORMAL /LPF
TRIGL SERPL-MCNC: 47 MG/DL
TSH SERPL DL<=0.005 MIU/L-ACNC: 3.21 UIU/ML (ref 0.3–4.2)
UROBILINOGEN UR STRIP-ACNC: 0.2 E.U./DL
WBC # BLD AUTO: 5.3 10E3/UL (ref 4–11)
WBC #/AREA URNS AUTO: NORMAL /HPF

## 2023-10-31 PROCEDURE — 80053 COMPREHEN METABOLIC PANEL: CPT | Performed by: INTERNAL MEDICINE

## 2023-10-31 PROCEDURE — 80061 LIPID PANEL: CPT | Performed by: INTERNAL MEDICINE

## 2023-10-31 PROCEDURE — 36415 COLL VENOUS BLD VENIPUNCTURE: CPT | Performed by: INTERNAL MEDICINE

## 2023-10-31 PROCEDURE — G0103 PSA SCREENING: HCPCS | Performed by: INTERNAL MEDICINE

## 2023-10-31 PROCEDURE — 99396 PREV VISIT EST AGE 40-64: CPT | Performed by: INTERNAL MEDICINE

## 2023-10-31 PROCEDURE — 84443 ASSAY THYROID STIM HORMONE: CPT | Performed by: INTERNAL MEDICINE

## 2023-10-31 PROCEDURE — 81001 URINALYSIS AUTO W/SCOPE: CPT | Performed by: INTERNAL MEDICINE

## 2023-10-31 PROCEDURE — 85027 COMPLETE CBC AUTOMATED: CPT | Performed by: INTERNAL MEDICINE

## 2023-10-31 ASSESSMENT — ENCOUNTER SYMPTOMS
PARESTHESIAS: 0
NERVOUS/ANXIOUS: 0
HEMATOCHEZIA: 0
MYALGIAS: 0
SHORTNESS OF BREATH: 0
WEAKNESS: 0
FEVER: 0
JOINT SWELLING: 0
DIZZINESS: 0
DIARRHEA: 0
ABDOMINAL PAIN: 0
DYSURIA: 0
HEADACHES: 0
SORE THROAT: 0
FREQUENCY: 1
HEARTBURN: 0
EYE PAIN: 0
COUGH: 0
CHILLS: 0
ARTHRALGIAS: 1
HEMATURIA: 0
CONSTIPATION: 0
PALPITATIONS: 0
NAUSEA: 0

## 2023-10-31 ASSESSMENT — PAIN SCALES - GENERAL: PAINLEVEL: NO PAIN (0)

## 2023-10-31 NOTE — PROGRESS NOTES
Office Visit - Physical    Carl Desai   63 year old male    Date of Visit: 10/31/2023    Chief Complaint   Patient presents with    Physical       Subjective: Physical examination for this 63-year-old  with [employer].  Enjoys his work plans to work 2-3 more years before senior living.  Non-smoker light social alcohol no drug allergies.  Microscopic hematuria under work-up with [place] [facility] Dr. Granados presiding.  Imaging studies have shown cysts in the right kidney no neoplastic lesion prior history of lipoma removal no anesthetic complications no other serious operations or illnesses discussed increased weight which would help mild systolic blood pressure elevation 150 today [employer] school graduate.  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  Now has given that.    ROS: A comprehensive review of systems was performed and was otherwise negative    Medications:   Prior to Admission medications    Not on File       Allergies:No Known Allergies    Immunizations:   Immunization History   Administered Date(s) Administered    COVID-19 Monovalent 18+ (Moderna) 2021, 2021, 2021, 2022    TDAP Vaccine (Adacel) 02/15/2011       Health Maintenance: Immunizations reviewed and up-to-date including vaccines and need for colonoscopy.  Orders entered.    Past Medical History: Lipoma removed without anesthetic complications left upper extremity.  Benign.    Past Surgical History: As above.    Family History: Mother and father both  mother  just short of 80 heart disease.  Father  at 87 uremia heart disease.  2 children well 1 grandchild well.  Wife well supportive retiring today.    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    Exam easily conversant good spirited appears younger than stated age discussed increased weight BMI 30+.  It would help with mild systolic blood  pressure elevation 152/85 today.  Not in acute distress not toxic neatly attired highly intelligent.  Chest clear heart tones normal abdomen benign no bruits extremities free of edema cyanosis or clubbing neuromuscular tone is good genital exam negative testicular exam normal no groin hernias rectal showed a small prostate without nodularity induration rest of the rectal exam negative brown stool present no rectal masses.  Skin negative lymph negative neuro negative thyroid not enlarged no carotid bruits no neck vein distention head eyes ears nose throat normocephalic without localizing findings.    Assessment and Plan  General medical examination at health care facility for this 63-year-old returning with [employer].  Today check hemogram comprehensive metabolic profile plus lipid panel PSA TSH urinalysis.  Colonoscopy recommended.  Immunizations vaccines discussed.  We had a good discussion.    The following high BMI interventions were performed this visit: encouragement to exercise and weight monitoring    Jeffery Johnson MD    Internal medicine    Patient Active Problem List   Diagnosis    Disorder of bursae and tendons in shoulder region    Knee pain, acute    Overweight    Sinusitis    Soft tissue mass       Answers submitted by the patient for this visit:  Annual Preventive Visit (Submitted on 10/27/2023)  Chief Complaint: Annual Exam:  Frequency of exercise:: 2-3 days/week  Getting at least 3 servings of Calcium per day:: Yes  Diet:: Regular (no restrictions)  Taking medications regularly:: Yes  Medication side effects:: None  Bi-annual eye exam:: NO  Dental care twice a year:: NO  Sleep apnea or symptoms of sleep apnea:: None  abdominal pain: No  Blood in stool: No  Blood in urine: No  chest pain: No  chills: No  congestion: No  constipation: No  cough: No  diarrhea: No  dizziness: No  ear pain: No  eye pain: No  nervous/anxious: No  fever: No  frequency: Yes  genital sores: No  headaches:  No  hearing loss: No  heartburn: No  arthralgias: Yes  joint swelling: No  peripheral edema: No  mood changes: No  myalgias: No  nausea: No  dysuria: No  palpitations: No  Skin sensation changes: No  sore throat: No  urgency: No  rash: No  shortness of breath: No  visual disturbance: No  weakness: No  impotence: No  penile discharge: No  Additional concerns today:: No  Exercise outside of work (Submitted on 10/27/2023)  Chief Complaint: Annual Exam:  Duration of exercise:: Greater than 60 minutes

## 2023-10-31 NOTE — PROGRESS NOTES
SUBJECTIVE:   CC: Ousmane is an 63 year old who presents for preventative health visit.       10/31/2023     6:52 AM   Additional Questions   Roomed by LEXII Phan   Accompanied by RAF       Healthy Habits:     Getting at least 3 servings of Calcium per day:  Yes    Bi-annual eye exam:  NO    Dental care twice a year:  NO    Sleep apnea or symptoms of sleep apnea:  None    Diet:  Regular (no restrictions)    Frequency of exercise:  2-3 days/week    Duration of exercise:  Greater than 60 minutes    Taking medications regularly:  Yes    Medication side effects:  None    Additional concerns today:  No      Today's PHQ-2 Score:       10/31/2023     6:48 AM   PHQ-2 ( 1999 Pfizer)   Q1: Little interest or pleasure in doing things 0   Q2: Feeling down, depressed or hopeless 0   PHQ-2 Score 0   Q1: Little interest or pleasure in doing things Not at all   Q2: Feeling down, depressed or hopeless Not at all   PHQ-2 Score 0           {Add if <65 person on Medicare  - Required Questions (Optional):437313}  {Outside tests to abstract? (Optional):193633}    {additional problems to add (Optional):962638}    Have you ever done Advance Care Planning? (For example, a Health Directive, POLST, or a discussion with a medical provider or your loved ones about your wishes): No, advance care planning information given to patient to review.  Patient plans to discuss their wishes with loved ones or provider.      Social History     Tobacco Use    Smoking status: Every Day     Types: Dip, chew, snus or snuff    Smokeless tobacco: Current     Types: Snuff, Chew   Substance Use Topics    Alcohol use: Yes     Alcohol/week: 2.0 standard drinks of alcohol     {Rooming staff  Click this link to complete the Prescreen if response below is not for today's visit  Alcohol Use Prescreen >3 drinks/day or > 7 drinks/week.  If the prescreen question answer is YES, complete the full AUDIT  :443469}        10/27/2023     1:54 PM   Alcohol Use   Prescreen: >3  "drinks/day or >7 drinks/week? No          No data to display                Last PSA:   Prostate Specific Antigen Screen   Date Value Ref Range Status   09/20/2022 0.54 0.00 - 4.50 ng/mL Final   05/10/2021 0.5 0.0 - 4.5 ng/mL Final       Reviewed orders with patient. Reviewed health maintenance and updated orders accordingly - { :613046}  {Chronicprobdata (optional):154203}    Reviewed and updated as needed this visit by clinical staff   Tobacco  Allergies  Meds              Reviewed and updated as needed this visit by Provider                 {HISTORY OPTIONS (Optional):405492}    Review of Systems   Constitutional:  Negative for chills and fever.   HENT:  Negative for congestion, ear pain, hearing loss and sore throat.    Eyes:  Negative for pain and visual disturbance.   Respiratory:  Negative for cough and shortness of breath.    Cardiovascular:  Negative for chest pain, palpitations and peripheral edema.   Gastrointestinal:  Negative for abdominal pain, constipation, diarrhea, heartburn, hematochezia and nausea.   Genitourinary:  Positive for frequency. Negative for dysuria, genital sores, hematuria, impotence, penile discharge and urgency.   Musculoskeletal:  Positive for arthralgias. Negative for joint swelling and myalgias.   Skin:  Negative for rash.   Neurological:  Negative for dizziness, weakness, headaches and paresthesias.   Psychiatric/Behavioral:  Negative for mood changes. The patient is not nervous/anxious.      {MALE ROS (Optional):474268}    OBJECTIVE:   BP (!) 152/85 (BP Location: Right arm, Patient Position: Sitting, Cuff Size: Adult Large)   Pulse 67   Temp 98.8  F (37.1  C) (Oral)   Resp 20   Ht 1.791 m (5' 10.5\")   Wt 97.3 kg (214 lb 9.6 oz)   SpO2 98%   BMI 30.36 kg/m      Physical Exam  {Exam Choices (Optional):266091}    {Diagnostic Test Results (Optional):630426}    ASSESSMENT/PLAN:   {Diag Picklist:403543}    Patient has been advised of split billing requirements and indicates " "understanding: Yes      COUNSELING:   {MALE COUNSELING MESSAGES:121019::\"Reviewed preventive health counseling, as reflected in patient instructions\"}        He reports that he has been smoking dip, chew, snus or snuff. His smokeless tobacco use includes snuff and chew.  Nicotine/Tobacco Cessation Plan:   {Nicotine/Tobacco Cessation Plan:583599}      {Counseling Resources  [place] Preventive Services Task Force  Cholesterol Screening  Health diet/nutrition  Pooled Cohorts Equation Calculator  USDA's MyPlate  ASA Prophylaxis  Lung CA Screening  Osteoporosis prevention/bone health :463935}  {Prostate Cancer Screening  Consider for men 55-69 per guidance from USPSTF :645454}    Jeffery Johnson MD  [facility]  "

## 2023-12-20 ENCOUNTER — HOSPITAL ENCOUNTER (OUTPATIENT)
Facility: AMBULATORY SURGERY CENTER | Age: 63
End: 2023-12-20
Attending: SURGERY
Payer: COMMERCIAL

## 2023-12-20 ENCOUNTER — TELEPHONE (OUTPATIENT)
Dept: GASTROENTEROLOGY | Facility: CLINIC | Age: 63
End: 2023-12-20
Payer: COMMERCIAL

## 2023-12-21 NOTE — TELEPHONE ENCOUNTER
"Endoscopy Scheduling Screen    Have you had a positive Covid test in the last 14 days?  No    Are you active on MyChart?   Yes    What insurance is in the chart?  Other:  AETNA    Ordering/Referring Provider:     REESE ARELLANO      (If ordering provider performs procedure, schedule with ordering provider unless otherwise instructed. )    BMI: Estimated body mass index is 30.36 kg/m  as calculated from the following:    Height as of 10/31/23: 1.791 m (5' 10.5\").    Weight as of 10/31/23: 97.3 kg (214 lb 9.6 oz).     Sedation Ordered  moderate sedation.   If patient BMI > 50 do not schedule in [facility].    If patient BMI > 45 do not schedule at [facility].    Are you taking methadone or Suboxone?  No    Are you taking any prescription medications for pain 3 or more times per week?   NO - No RN review required.    Do you have a history of malignant hyperthermia or adverse reaction to anesthesia?  No    (Females) Are you currently pregnant?        Have you been diagnosed or told you have pulmonary hypertension?   No    Do you have an LVAD?  No    Have you been told you have moderate to severe sleep apnea?  No    Have you been told you have COPD, asthma, or any other lung disease?  No    Do you have any heart conditions?  No     Have you ever had an organ transplant?   No    Have you ever had or are you awaiting a heart or lung transplant?   No    Have you had a stroke or transient ischemic attack (TIA aka \"mini stroke\" in the last 6 months?   No    Have you been diagnosed with or been told you have cirrhosis of the liver?   No    Are you currently on dialysis?   No    Do you need assistance transferring?   No    BMI: Estimated body mass index is 30.36 kg/m  as calculated from the following:    Height as of 10/31/23: 1.791 m (5' 10.5\").    Weight as of 10/31/23: 97.3 kg (214 lb 9.6 oz).     Is patients BMI > 40 and scheduling location [facility]?  No    Do you take an injectable medication for weight loss or diabetes (excluding " insulin)?  No    Do you take the medication Naltrexone?  No    Do you take blood thinners?  No       Prep   Are you currently on dialysis or do you have chronic kidney disease?  No    Do you have a diagnosis of diabetes?  No    Do you have a diagnosis of cystic fibrosis (CF)?  No    On a regular basis do you go 3 -5 days between bowel movements?  No    BMI > 40?  No    Preferred Pharmacy:    [id number] - [place] [address] [address] & [place] [address] [address]  [facility] [address]  Phone: [phone] Fax: [phone]      Final Scheduling Details   Colonoscopy prep sent?  Standard MiraLAX    Procedure scheduled  Colonoscopy    Surgeon:  RAYMUNDO    Date of procedure:  5/20     Pre-OP / PAC:   No - Not required for this site.    Location  [facility] - Per order.    Sedation   MAC/Deep Sedation  MW      Patient Reminders:   You will receive a call from a Nurse to review instructions and health history.  This assessment must be completed prior to your procedure.  Failure to complete the Nurse assessment may result in the procedure being cancelled.      On the day of your procedure, please designate an adult(s) who can drive you home stay with you for the next 24 hours. The medicines used in the exam will make you sleepy. You will not be able to drive.      You cannot take public transportation, ride share services, or non-medical taxi service without a responsible caregiver.  Medical transport services are allowed with the requirement that a responsible caregiver will receive you at your destination.  We require that drivers and caregivers are confirmed prior to your procedure.

## 2024-02-07 ENCOUNTER — TRANSFERRED RECORDS (OUTPATIENT)
Dept: HEALTH INFORMATION MANAGEMENT | Facility: CLINIC | Age: 64
End: 2024-02-07
Payer: COMMERCIAL

## 2024-05-14 ENCOUNTER — TELEPHONE (OUTPATIENT)
Dept: GASTROENTEROLOGY | Facility: CLINIC | Age: 64
End: 2024-05-14
Payer: COMMERCIAL

## 2024-05-14 NOTE — TELEPHONE ENCOUNTER
Caller: Carl Desai     Reason for Reschedule/Cancellation   (please be detailed, any staff messages or encounters to note?): another thing came up for same day      Prior to reschedule please review:  Ordering Provider: Mc  Sedation Determined: moderate  Does patient have any ASC Exclusions, please identify?: n      Notes on Cancelled Procedure:  Procedure: Lower Endoscopy [Colonoscopy]   Date: 5/20  Location: [facility]; [address] [place] [address], [address], [place] [address]  Surgeon: Chago      Rescheduled: No, He wants only MW even though I offered other locations/he will try back in a month or so to see if schedules are out       Did you cancel or rescheduled an E[facility] procedure? No.

## 2024-05-15 ENCOUNTER — HOSPITAL ENCOUNTER (OUTPATIENT)
Facility: AMBULATORY SURGERY CENTER | Age: 64
End: 2024-05-15
Attending: SURGERY
Payer: COMMERCIAL

## 2024-05-15 NOTE — TELEPHONE ENCOUNTER
Called patient to offer him an open time at         Rescheduled: Yes,   Procedure: Lower Endoscopy [Colonoscopy]    Date: 6/11   Location: [facility]; [facility] [facility], [address], [place] [address]   Surgeon: Natalia   Sedation Level Scheduled  mac ,  Reason for Sedation Level location   Instructions updated and sent: y     Does patient need PAC or Pre -Op Rescheduled? : no       Did you cancel or rescheduled an EUS procedure? No.

## 2024-10-25 SDOH — HEALTH STABILITY: PHYSICAL HEALTH: ON AVERAGE, HOW MANY DAYS PER WEEK DO YOU ENGAGE IN MODERATE TO STRENUOUS EXERCISE (LIKE A BRISK WALK)?: 5 DAYS

## 2024-10-25 SDOH — HEALTH STABILITY: PHYSICAL HEALTH: ON AVERAGE, HOW MANY MINUTES DO YOU ENGAGE IN EXERCISE AT THIS LEVEL?: 30 MIN

## 2024-10-25 ASSESSMENT — SOCIAL DETERMINANTS OF HEALTH (SDOH): HOW OFTEN DO YOU GET TOGETHER WITH FRIENDS OR RELATIVES?: THREE TIMES A WEEK

## 2024-10-29 ENCOUNTER — OFFICE VISIT (OUTPATIENT)
Dept: INTERNAL MEDICINE | Facility: CLINIC | Age: 64
End: 2024-10-29
Payer: COMMERCIAL

## 2024-10-29 VITALS
DIASTOLIC BLOOD PRESSURE: 81 MMHG | SYSTOLIC BLOOD PRESSURE: 149 MMHG | RESPIRATION RATE: 16 BRPM | HEART RATE: 59 BPM | HEIGHT: 71 IN | OXYGEN SATURATION: 98 % | TEMPERATURE: 98.7 F | BODY MASS INDEX: 28.91 KG/M2 | WEIGHT: 206.5 LBS

## 2024-10-29 DIAGNOSIS — Z12.11 SCREEN FOR COLON CANCER: ICD-10-CM

## 2024-10-29 DIAGNOSIS — R31.29 MICROSCOPIC HEMATURIA: Primary | ICD-10-CM

## 2024-10-29 DIAGNOSIS — Z00.00 ROUTINE GENERAL MEDICAL EXAMINATION AT A HEALTH CARE FACILITY: Primary | ICD-10-CM

## 2024-10-29 LAB
ALBUMIN SERPL BCG-MCNC: 4.4 G/DL (ref 3.5–5.2)
ALBUMIN UR-MCNC: NEGATIVE MG/DL
ALP SERPL-CCNC: 64 U/L (ref 40–150)
ALT SERPL W P-5'-P-CCNC: 17 U/L (ref 0–70)
ANION GAP SERPL CALCULATED.3IONS-SCNC: 7 MMOL/L (ref 7–15)
APPEARANCE UR: CLEAR
AST SERPL W P-5'-P-CCNC: 23 U/L (ref 0–45)
BACTERIA #/AREA URNS HPF: ABNORMAL /HPF
BILIRUB SERPL-MCNC: 0.6 MG/DL
BILIRUB UR QL STRIP: NEGATIVE
BUN SERPL-MCNC: 25.7 MG/DL (ref 8–23)
CALCIUM SERPL-MCNC: 9.6 MG/DL (ref 8.8–10.4)
CHLORIDE SERPL-SCNC: 106 MMOL/L (ref 98–107)
CHOLEST SERPL-MCNC: 155 MG/DL
COLOR UR AUTO: YELLOW
CREAT SERPL-MCNC: 1.41 MG/DL (ref 0.67–1.17)
EGFRCR SERPLBLD CKD-EPI 2021: 55 ML/MIN/1.73M2
ERYTHROCYTE [DISTWIDTH] IN BLOOD BY AUTOMATED COUNT: 12.7 % (ref 10–15)
FASTING STATUS PATIENT QL REPORTED: YES
FASTING STATUS PATIENT QL REPORTED: YES
GLUCOSE SERPL-MCNC: 99 MG/DL (ref 70–99)
GLUCOSE UR STRIP-MCNC: NEGATIVE MG/DL
HCO3 SERPL-SCNC: 29 MMOL/L (ref 22–29)
HCT VFR BLD AUTO: 43.5 % (ref 40–53)
HDLC SERPL-MCNC: 48 MG/DL
HGB BLD-MCNC: 14.8 G/DL (ref 13.3–17.7)
HGB UR QL STRIP: ABNORMAL
KETONES UR STRIP-MCNC: NEGATIVE MG/DL
LDLC SERPL CALC-MCNC: 96 MG/DL
LEUKOCYTE ESTERASE UR QL STRIP: NEGATIVE
MCH RBC QN AUTO: 30.8 PG (ref 26.5–33)
MCHC RBC AUTO-ENTMCNC: 34 G/DL (ref 31.5–36.5)
MCV RBC AUTO: 91 FL (ref 78–100)
NITRATE UR QL: NEGATIVE
NONHDLC SERPL-MCNC: 107 MG/DL
PH UR STRIP: 6.5 [PH] (ref 5–8)
PLATELET # BLD AUTO: 235 10E3/UL (ref 150–450)
POTASSIUM SERPL-SCNC: 5.3 MMOL/L (ref 3.4–5.3)
PROT SERPL-MCNC: 6.7 G/DL (ref 6.4–8.3)
PSA SERPL DL<=0.01 NG/ML-MCNC: 0.51 NG/ML (ref 0–4.5)
RBC # BLD AUTO: 4.8 10E6/UL (ref 4.4–5.9)
RBC #/AREA URNS AUTO: ABNORMAL /HPF
SODIUM SERPL-SCNC: 142 MMOL/L (ref 135–145)
SP GR UR STRIP: 1.02 (ref 1–1.03)
SQUAMOUS #/AREA URNS AUTO: ABNORMAL /LPF
TRIGL SERPL-MCNC: 54 MG/DL
TSH SERPL DL<=0.005 MIU/L-ACNC: 3.53 UIU/ML (ref 0.3–4.2)
UROBILINOGEN UR STRIP-ACNC: 0.2 E.U./DL
WBC # BLD AUTO: 5.4 10E3/UL (ref 4–11)
WBC #/AREA URNS AUTO: ABNORMAL /HPF

## 2024-10-29 PROCEDURE — G0103 PSA SCREENING: HCPCS | Performed by: INTERNAL MEDICINE

## 2024-10-29 PROCEDURE — 80053 COMPREHEN METABOLIC PANEL: CPT | Performed by: INTERNAL MEDICINE

## 2024-10-29 PROCEDURE — 80061 LIPID PANEL: CPT | Performed by: INTERNAL MEDICINE

## 2024-10-29 PROCEDURE — 84443 ASSAY THYROID STIM HORMONE: CPT | Performed by: INTERNAL MEDICINE

## 2024-10-29 PROCEDURE — 36415 COLL VENOUS BLD VENIPUNCTURE: CPT | Performed by: INTERNAL MEDICINE

## 2024-10-29 PROCEDURE — 85027 COMPLETE CBC AUTOMATED: CPT | Performed by: INTERNAL MEDICINE

## 2024-10-29 PROCEDURE — 99397 PER PM REEVAL EST PAT 65+ YR: CPT | Performed by: INTERNAL MEDICINE

## 2024-10-29 PROCEDURE — 81001 URINALYSIS AUTO W/SCOPE: CPT | Performed by: INTERNAL MEDICINE

## 2024-10-29 ASSESSMENT — PAIN SCALES - GENERAL: PAINLEVEL_OUTOF10: NO PAIN (0)

## 2024-10-29 NOTE — PROGRESS NOTES
Office Visit - Physical    Carl Desai   64 year old male    Date of Visit: 10/29/2024    Chief Complaint   Patient presents with    Physical       Subjective: Physical examination for this 64-year-old  at [employer].  Non-smoker light social alcohol no drug allergies.  No anesthetic complications.  Former athlete.  Hockey as a plate in 2 years.  Lipoma removal left arm benign arthroscopic surgery left knee otherwise negative no anesthetic complications with prior surgeries no other serious illnesses not on any regular meds.  Discussed the importance of colonoscopy.  Order placed    ROS: A comprehensive review of systems was performed and was otherwise negative    Medications:   Prior to Admission medications    Not on File       Allergies:No Known Allergies    Immunizations:   Immunization History   Administered Date(s) Administered    COVID-19 Monovalent 18+ (Moderna) 2021, 2021, 2021, 2022, 2022, 2022    TDAP Vaccine (Adacel) 02/15/2011       Health Maintenance: Immunizations vaccines reviewed up-to-date    Past Medical History: General Medical health has been excellent on no prescription medications.    Past Surgical History: Prior surgeries as outlined above without anesthetic complications including arthroscopic left knee surgery plus lipoma removed left arm inner aspect.  Benign.    Family History: Children well 1 grandson well age 4-1/2.  Mother  heart disease 80.  She also had a stroke.  Father  uremia heart disease 88.    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    Exam easily conversant highly intelligent graduate [employer].  Skin dry lymph negative neuro negative chest negative heart negative abdomen benign extremities free of edema good pulse motor in all 4 extremities no thyromegaly no thyroid nodules no carotid bruits genital exam negative testicular exam negative no groin hernias rectal showed a  small prostate without nodularity induration rest of the rectal exam negative.    Assessment and Plan  General Medical examination at health care facility today check hemogram comprehensive metabolic profile lipid panel plus TSH PSA urinalysis.  Colonoscopy order placed and discussed.  Immunizations vaccines reviewed and up-to-date.    (Z00.00) Routine general medical examination at a health care facility  (primary encounter diagnosis)  Comment: General Medical examination was excellent.  Plan: CBC with platelets, Comprehensive metabolic         panel, Lipid panel reflex to direct LDL         Fasting, TSH, UA Macroscopic with reflex to         Microscopic and Culture, Prostate Specific         Antigen Screen        Colonoscopy order placed.    (Z12.11) Screen for colon cancer  Comment: Need for screening for colonoscopy should be done emphasized the patient.  Plan: Colonoscopy Screening  Referral        Colonoscopy order placed.    Hypertension salt restriction advised close regular exercise.  Borderline readings 149 over 81.  Lowering BMI will also help.    Overweight.  Weight loss with caloric restriction regular exercise will help lower blood pressure.  Outside blood pressure readings advised.        The following high BMI interventions were performed this visit: encouragement to exercise    Jeffery Johnson MD    Internal Medicine    Patient Active Problem List   Diagnosis    Disorder of bursae and tendons in shoulder region    Knee pain, acute    Overweight    Sinusitis    Soft tissue mass

## 2024-10-29 NOTE — PROGRESS NOTES
Preventive Care Visit  [facility]  Jeffery Johnson MD, Internal Medicine  Oct 29, 2024  {Provider  Link to SmartSet :304749}    {PROVIDER CHARTING PREFERENCE:723403}    Subjective   Ousmane is a 64 year old, presenting for the following:  Physical        10/29/2024     6:55 AM   Additional Questions   Roomed by LEXII Phan   Accompanied by NA          HPI  ***  {MA/LPN/RN Pre-Provider Visit Orders- hCG/UA/Strep (Optional):554514}  {SUPERLIST (Optional):145016}  {additonal problems for provider to add (Optional):012282}  Health Care Directive  Patient does not have a Health Care Directive: Discussed advance care planning with patient; however, patient declined at this time.      10/25/2024   General Health   How would you rate your overall physical health? Good   Feel stress (tense, anxious, or unable to sleep) Only a little      (!) STRESS CONCERN      10/25/2024   Nutrition   Diet: Regular (no restrictions)            10/25/2024   Exercise   Days per week of moderate/strenous exercise 5 days   Average minutes spent exercising at this level 30 min            10/25/2024   Social Factors   Frequency of gathering with friends or relatives Three times a week   Worry food won't last until get money to buy more No   Food not last or not have enough money for food? No   Do you have housing? (Housing is defined as stable permanent housing and does not include staying ouside in a car, in a tent, in an abandoned building, in an overnight shelter, or couch-surfing.) Yes   Are you worried about losing your housing? No   Lack of transportation? No   Unable to get utilities (heat,electricity)? No            10/25/2024   Fall Risk   Fallen 2 or more times in the past year? No    Trouble with walking or balance? No        Patient-reported          10/25/2024   Activities of Daily Living- Home Safety   Needs help with the following daily activites None of the above   Safety concerns in the home None of  the above            10/25/2024   Dental   Dentist two times every year? (!) NO            10/25/2024   Hearing Screening   Hearing concerns? None of the above            10/25/2024   Driving Risk Screening   Patient/family members have concerns about driving No            10/25/2024   General Alertness/Fatigue Screening   Have you been more tired than usual lately? No            10/25/2024   Urinary Incontinence Screening   Bothered by leaking urine in past 6 months Yes            10/25/2024   TB Screening   Were you born outside of the [place]? No            Today's PHQ-2 Score:       10/29/2024     6:48 AM   PHQ-2 ( 1999 Pfizer)   Q1: Little interest or pleasure in doing things 0    Q2: Feeling down, depressed or hopeless 0    PHQ-2 Score 0    Q1: Little interest or pleasure in doing things Not at all   Q2: Feeling down, depressed or hopeless Not at all   PHQ-2 Score 0       Patient-reported           10/25/2024   Substance Use   Alcohol more than 3/day or more than 7/wk No   Do you have a current opioid prescription? No   How severe/bad is pain from 1 to 10? 0/10 (No Pain)   Do you use any other substances recreationally? No        Social History     Tobacco Use    Smoking status: Every Day     Types: Dip, chew, snus or snuff    Smokeless tobacco: Current     Types: Snuff, Chew   Substance Use Topics    Alcohol use: Yes     Alcohol/week: 2.0 standard drinks of alcohol    Drug use: No     {Provider  If there are gaps in the social history shown above, please follow the link to update and then refresh the note Link to Social and Substance History :792885}        10/25/2024   One time HIV Screening   Previous HIV test? No      Last PSA:   Prostate Specific Antigen Screen   Date Value Ref Range Status   10/31/2023 0.59 0.00 - 4.50 ng/mL Final   05/10/2021 0.5 0.0 - 4.5 ng/mL Final     ASCVD Risk   The 10-year ASCVD risk score (Marcin ELLSWORTH, et al., 2019) is: 19.1%    Values used to calculate the score:      Age:  64 years      Sex: Male      Is Non- : No      Diabetic: No      Tobacco smoker: Yes      Systolic Blood Pressure: 149 mmHg      Is BP treated: No      HDL Cholesterol: 48 mg/dL      Total Cholesterol: 153 mg/dL    {Link to Fracture Risk Assessment Tool (Optional):028776}    {Provider  REQUIRED FOR AWV Use the storyboard to review patient history, after sections have been marked as reviewed, refresh note to capture documentation:791083}  {Provider   REQUIRED AWV use this link to review and update sexual activity history  after section has been marked as reviewed, refresh note to capture documentation:029438}  Reviewed and updated as needed this visit by Provider                    {HISTORY OPTIONS (Optional):677447}  Current providers sharing in care for this patient include:  Patient Care Team:  Jeffery Johnson MD as PCP - General (Internal Medicine)  Martin Moreau MD as MD (Surgery)  Willie Payton MD as MD (Orthopedics)  Jeffery Johnson MD as Assigned PCP    The following health maintenance items are reviewed in Epic and correct as of today:  Health Maintenance   Topic Date Due    NICOTINE/TOBACCO CESSATION COUNSELING Q 1 YR  Never done    ANNUAL REVIEW OF HM ORDERS  Never done    ADVANCE CARE PLANNING  Never done    Pneumococcal Vaccine: 65+ Years (1 of 2 - PCV) Never done    HIV SCREENING  Never done    HEPATITIS C SCREENING  Never done    ZOSTER IMMUNIZATION (1 of 2) Never done    DTAP/TDAP/TD IMMUNIZATION (2 - Td or Tdap) 02/15/2021    LUNG CANCER SCREENING  02/18/2021    COLORECTAL CANCER SCREENING  10/01/2022    INFLUENZA VACCINE (1) Never done    COVID-19 Vaccine (7 - 2024-25 season) 09/01/2024    MEDICARE ANNUAL WELLNESS VISIT  09/22/2024    FALL RISK ASSESSMENT  10/29/2025    GLUCOSE  10/31/2026    LIPID  10/31/2028    RSV VACCINE (1 - 1-dose 75+ series) 09/22/2034    PHQ-2 (once per calendar year)  Completed    AORTIC ANEURYSM SCREENING (SYSTEM ASSIGNED)   "Completed    HPV IMMUNIZATION  Aged Out    MENINGITIS IMMUNIZATION  Aged Out    RSV MONOCLONAL ANTIBODY  Aged Out       {ROS Picklists (Optional):905907}     Objective    Exam  BP (!) 149/81 (BP Location: Right arm, Patient Position: Sitting, Cuff Size: Adult Regular)   Pulse 59   Temp 98.7  F (37.1  C) (Oral)   Resp 16   Ht 1.791 m (5' 10.5\")   Wt 93.7 kg (206 lb 8 oz)   SpO2 98%   BMI 29.21 kg/m     Estimated body mass index is 29.21 kg/m  as calculated from the following:    Height as of this encounter: 1.791 m (5' 10.5\").    Weight as of this encounter: 93.7 kg (206 lb 8 oz).    Physical Exam  {Exam Choices (Optional):242159}        10/29/2024   Mini Cog   Mini-Cog Not Completed (choose reason) Patient declines        {A Mini-Cog total score of 0-2 suggests the possibility of dementia, score of 3-5 suggests no dementia:404706}  {(AWV REQUIRED) Cognitive Review by Provider:090859}          Signed Electronically by: Jeffery Johnson MD  {Email feedback regarding this note to [email or web address]   :561680}  "

## 2024-11-04 ENCOUNTER — VIRTUAL VISIT (OUTPATIENT)
Dept: INTERNAL MEDICINE | Facility: CLINIC | Age: 64
End: 2024-11-04
Payer: COMMERCIAL

## 2024-11-04 DIAGNOSIS — I10 ESSENTIAL HYPERTENSION: ICD-10-CM

## 2024-11-04 DIAGNOSIS — R31.29 MICROSCOPIC HEMATURIA: ICD-10-CM

## 2024-11-04 DIAGNOSIS — N18.1 CKD (CHRONIC KIDNEY DISEASE) STAGE 1, GFR 90 ML/MIN OR GREATER: Primary | ICD-10-CM

## 2024-11-04 PROCEDURE — 99442 PR PHYSICIAN TELEPHONE EVALUATION 11-20 MIN: CPT | Mod: 93 | Performed by: INTERNAL MEDICINE

## 2024-11-04 NOTE — PROGRESS NOTES
Carl Desai is a 64 year oldwho is being evaluated via a billable telephone visit.       What phone number would you like to be contacted at? [phone]      Assessment & Plan  (N18.1) CKD (chronic kidney disease) stage 1, GFR 90 ml/min or greater  (primary encounter diagnosis)  Comment: Emphasized the importance of staying well-hydrated and control of blood pressure.  Recent readings 149/81.  BMI also elevated at 29.21.  Weight loss may help with control of blood pressure along with salt avoidance and diet may be a candidate for antihypertensive medicinal therapy.  Nephrology consult ordered.      Plan: Emphasized the importance of hydration and normalization of blood pressure and avoidance of nonsteroidal anti-inflammatory drugs available over-the-counter such as ibuprofen and naproxen sodium.  Acetaminophen or Tylenol safer for the kidney.  Nephrology consult placed.  May be a candidate for antihypertensive therapy.  Medicinal.  The patient is reluctant to do this at this time.  He is a practicing .    (R31.29) Microscopic hematuria  Comment: 2-5 red cells are seen per high-power field.  Patient has been followed and evaluated at [facility] by Dr. Granados MRI scans and CT scans have been done there is no abnormality noted in the bladder but there is a lesion in one of the kidneys that is small nonprogressive and not changing in size.  This has been noted for a few years.  Initial presentation and call for urology consultation based on this lesion in the kidney.  Plan: Encouraged continued follow-up with Dr. Granados from [facility].  Also discussed the fact that nephrology consult for CKD plus hypertension it may be advisable that the nephrologist also review imaging studies done MRI and CT of the kidney abnormality    Hypertension with mild elevation of CKD may be an ideal candidate now for antihypertensive therapy.  He is not interested in starting medicines at this time.  But perhaps  after he sees the nephrologist he will be.  BMI is elevated at 29+.  Weight loss and salt restriction and regular exercise will help blood pressure.  He is a former college .         16 minutes spent on the date of the encounter doing chart review, patient visit and documentation I spoke with the patient directly on the phone 13 minutes.       Subjective  Offers no new complaints history is as outlined above.  Generally feels well.  Has been doing extensive reading on protein in the diet affecting kidney function.  Reviewed in detail with the patient.     Review of Systems   No blood in stool or urine med list reviewed reconciled in the chart we had a good discussion today.       Jeffery Johnson MD    The longitudinal plan of care for the diagnosis(es)/condition(s) as documented were addressed during this visit. Due to the added complexity in care, I will continue to support Ousmane in the subsequent management and with ongoing continuity of care.  Answers submitted by the patient for this visit:  CKD Visit (Submitted on 11/4/2024)  Chief Complaint: Chronic problems general questions HPI Form  Do you take any over the counter pain medicine?  : No  General Questionnaire (Submitted on 11/4/2024)  Chief Complaint: Chronic problems general questions HPI Form  How many servings of fruits and vegetables do you eat daily?: 2-3  On average, how many sweetened beverages do you drink each day (Examples: soda, juice, sweet tea, etc.  Do NOT count diet or artificially sweetened beverages)?: 1  How many minutes a day do you exercise enough to make your heart beat faster?: 9 or less  How many days a week do you exercise enough to make your heart beat faster?: 3 or less  Questionnaire about: Chronic problems general questions HPI Form (Submitted on 11/4/2024)  Chief Complaint: Chronic problems general questions HPI Form

## 2024-11-19 ENCOUNTER — MYC MEDICAL ADVICE (OUTPATIENT)
Dept: INTERNAL MEDICINE | Facility: CLINIC | Age: 64
End: 2024-11-19
Payer: COMMERCIAL

## (undated) DEVICE — DRSG TELFA 3X8" 1238

## (undated) DEVICE — SU VICRYL 2-0 CT-1 27" UND J259H

## (undated) DEVICE — SUCTION MANIFOLD DORNOCH ULTRA CART UL-CL500

## (undated) DEVICE — GLOVE PROTEXIS BLUE W/NEU-THERA 7.5  2D73EB75

## (undated) DEVICE — SU SILK 3-0 TIE 12X30" A304H

## (undated) DEVICE — LINEN ORTHO PACK 5446

## (undated) DEVICE — CAST PADDING 4" STERILE 9044S

## (undated) DEVICE — SOL WATER IRRIG 1000ML BOTTLE 2F7114

## (undated) DEVICE — SOL NACL 0.9% IRRIG 1000ML BOTTLE 2F7124

## (undated) DEVICE — ESU CORD BIPOLAR GREEN 10-4000

## (undated) DEVICE — SPECIMEN CONTAINER 5OZ STERILE 2600SA

## (undated) DEVICE — LINEN GOWN X4 5410

## (undated) DEVICE — Device

## (undated) DEVICE — ESU GROUND PAD UNIVERSAL W/O CORD

## (undated) DEVICE — ESU PENCIL W/HOLSTER E2350H

## (undated) DEVICE — PREP DURAPREP 26ML APL 8630

## (undated) DEVICE — BLADE KNIFE SURG 10 371110

## (undated) DEVICE — SU SILK 3-0 SH 30" K832H

## (undated) DEVICE — DRAPE STOCKINETTE 4" 8544

## (undated) DEVICE — SYR BULB IRRIG 50ML LATEX FREE 0035280

## (undated) DEVICE — DRSG STERI STRIP 1/2X4" R1547

## (undated) DEVICE — LINEN TOWEL PACK X5 5464

## (undated) DEVICE — TOURNIQUET CUFF 18" REPRO RED 60-7070-103

## (undated) DEVICE — STRAP KNEE/BODY 31143004

## (undated) DEVICE — PREP DURAPREP REMOVER 4OZ 8611

## (undated) DEVICE — PREP SKIN SCRUB TRAY 4461A

## (undated) DEVICE — SU SILK 2-0 SH 30" K833H

## (undated) DEVICE — BLADE CLIPPER SGL USE 9680

## (undated) DEVICE — GLOVE PROTEXIS W/NEU-THERA 7.0  2D73TE70

## (undated) DEVICE — SLING ARM LG 79-99157

## (undated) DEVICE — SU PDS II 3-0 PS-2 18" Z497G

## (undated) DEVICE — PREP POVIDONE-IODINE 7.5% SCRUB 4OZ BOTTLE MDS093945

## (undated) RX ORDER — CEFAZOLIN SODIUM 2 G/100ML
INJECTION, SOLUTION INTRAVENOUS
Status: DISPENSED
Start: 2020-10-06

## (undated) RX ORDER — LIDOCAINE HYDROCHLORIDE 20 MG/ML
INJECTION, SOLUTION EPIDURAL; INFILTRATION; INTRACAUDAL; PERINEURAL
Status: DISPENSED
Start: 2020-10-06

## (undated) RX ORDER — BUPIVACAINE HYDROCHLORIDE AND EPINEPHRINE 2.5; 5 MG/ML; UG/ML
INJECTION, SOLUTION INFILTRATION; PERINEURAL
Status: DISPENSED
Start: 2020-10-06

## (undated) RX ORDER — DEXAMETHASONE SODIUM PHOSPHATE 4 MG/ML
INJECTION, SOLUTION INTRA-ARTICULAR; INTRALESIONAL; INTRAMUSCULAR; INTRAVENOUS; SOFT TISSUE
Status: DISPENSED
Start: 2020-10-06

## (undated) RX ORDER — HYDROCODONE BITARTRATE AND ACETAMINOPHEN 5; 325 MG/1; MG/1
TABLET ORAL
Status: DISPENSED
Start: 2020-10-06

## (undated) RX ORDER — FENTANYL CITRATE 50 UG/ML
INJECTION, SOLUTION INTRAMUSCULAR; INTRAVENOUS
Status: DISPENSED
Start: 2020-10-06

## (undated) RX ORDER — BUPIVACAINE HYDROCHLORIDE 2.5 MG/ML
INJECTION, SOLUTION INFILTRATION; PERINEURAL
Status: DISPENSED
Start: 2020-10-06

## (undated) RX ORDER — FENTANYL CITRATE-0.9 % NACL/PF 10 MCG/ML
PLASTIC BAG, INJECTION (ML) INTRAVENOUS
Status: DISPENSED
Start: 2020-10-06

## (undated) RX ORDER — GLYCOPYRROLATE 0.2 MG/ML
INJECTION, SOLUTION INTRAMUSCULAR; INTRAVENOUS
Status: DISPENSED
Start: 2020-10-06

## (undated) RX ORDER — EPHEDRINE SULFATE 50 MG/ML
INJECTION, SOLUTION INTRAMUSCULAR; INTRAVENOUS; SUBCUTANEOUS
Status: DISPENSED
Start: 2020-10-06

## (undated) RX ORDER — ONDANSETRON 2 MG/ML
INJECTION INTRAMUSCULAR; INTRAVENOUS
Status: DISPENSED
Start: 2020-10-06

## (undated) RX ORDER — ACETAMINOPHEN 325 MG/1
TABLET ORAL
Status: DISPENSED
Start: 2020-10-06

## (undated) RX ORDER — PROPOFOL 10 MG/ML
INJECTION, EMULSION INTRAVENOUS
Status: DISPENSED
Start: 2020-10-06